# Patient Record
Sex: MALE | Race: WHITE
[De-identification: names, ages, dates, MRNs, and addresses within clinical notes are randomized per-mention and may not be internally consistent; named-entity substitution may affect disease eponyms.]

---

## 2017-10-11 ENCOUNTER — HOSPITAL ENCOUNTER (OUTPATIENT)
Dept: HOSPITAL 22 - RAD | Age: 77
End: 2017-10-11
Attending: OTOLARYNGOLOGY
Payer: MEDICARE

## 2017-10-11 DIAGNOSIS — R59.0: ICD-10-CM

## 2017-10-11 DIAGNOSIS — S03.00XA: Primary | ICD-10-CM

## 2017-10-11 LAB
BASOPHILS # BLD AUTO: 1.9 K/MM3 (ref 0.7–4.5)
BUN: 18 MG/DL (ref 7–18)
EOSINOPHIL NFR BLD AUTO: 29.9 % (ref 10–50)
GFR SERPLBLD BASED ON 1.73 SQ M-ARVRAT: 82 ML/MIN (ref 60–?)
HCT VFR BLD CALC: 16 G/DL (ref 14.1–18)

## 2017-10-11 NOTE — RADIOLOGY REPORT PS360
CT SOFT TISSUE NECK W/CONTRAST
 
COMPARISON: None
 
HISTORY: Suspected left parotid gland swelling, intermittent dislocation left
temporal mandibular joint
 
TECHNIQUE: Multiaxial scans were obtained from the level of the fore head to the
upper chest following injection of IV contrast. Sagittal and coronal reformats
were evaluated as well.
 
FINDINGS: There is mild to moderate diffuse swelling of the left parotid gland
when compared to the right gland. The left parotid gland maintains is normal
attenuation with homogeneous appearing parenchyma. There does appear to be a
very slight 2 to 3 mm lateral and slightly inferior subluxation of the left
mandibular condyle in relationship to the temporomandibular fossa. The right TMJ
appears normal. The mastoids appear clear bilaterally and both internal auditory
canals appear normal. There is prominent streak artifact crossing the oral
cavity from dental amalgam which partially degrades images of both carotid and
glands. The submandibular glands appear normal and symmetrical. The valleculae
and piriform sinuses appear normal. The false and true vocal cords appear
normal. There are 2 tiny hypodense and likely cystic lesions in the right lobe
of the thyroid with a single hypodense lesion of the left lobe which appears to
have internal echoes and could be an adenoma. Consider follow-up ultrasound the
thyroid for additional evaluation. There is no abnormal cervical
lymphadenopathy.
 
 IMPRESSION: Mild to moderate diffuse swelling of the left parotid gland but
basically maintaining its normal morphology and likely due to mild diffuse
inflammation. There appears be mild lateral subluxation of the left TMJ as
discussed above.

## 2019-11-27 ENCOUNTER — OFFICE VISIT (OUTPATIENT)
Dept: ORTHOPEDIC SURGERY | Facility: CLINIC | Age: 79
End: 2019-11-27

## 2019-11-27 VITALS — WEIGHT: 283 LBS | OXYGEN SATURATION: 98 % | HEART RATE: 94 BPM | BODY MASS INDEX: 38.33 KG/M2 | HEIGHT: 72 IN

## 2019-11-27 DIAGNOSIS — M17.12 PRIMARY OSTEOARTHRITIS OF LEFT KNEE: Primary | ICD-10-CM

## 2019-11-27 DIAGNOSIS — M25.552 PAIN OF LEFT HIP JOINT: ICD-10-CM

## 2019-11-27 PROCEDURE — 99203 OFFICE O/P NEW LOW 30 MIN: CPT | Performed by: ORTHOPAEDIC SURGERY

## 2019-11-27 RX ORDER — TEMAZEPAM 15 MG/1
CAPSULE ORAL
Refills: 1 | COMMUNITY
Start: 2019-10-25

## 2019-11-27 RX ORDER — FINASTERIDE 5 MG/1
5 TABLET, FILM COATED ORAL DAILY
Refills: 2 | COMMUNITY
Start: 2019-08-23

## 2019-11-27 RX ORDER — CITALOPRAM 20 MG/1
20 TABLET ORAL DAILY
Refills: 1 | COMMUNITY
Start: 2019-09-17

## 2019-11-27 RX ORDER — TAMSULOSIN HYDROCHLORIDE 0.4 MG/1
2 CAPSULE ORAL DAILY
Refills: 1 | COMMUNITY
Start: 2019-08-26

## 2019-11-27 RX ORDER — CETIRIZINE HYDROCHLORIDE 10 MG/1
10 TABLET ORAL DAILY
Refills: 1 | COMMUNITY
Start: 2019-10-23

## 2019-11-27 NOTE — PROGRESS NOTES
[facility] [place] [facility] Note    Subjective     Chief Complaint   Patient presents with   • Left Knee - Pain        HPI    Gio Gutiérrez is a 79 y.o. male.  He presents today for evaluation of left knee pain.  Knee has been bothering him for 3 months, following a particular injury.  The pain has been improving, is mild today, worse when he stands for a while, with no injury recently.      There is no problem list on file for this patient.    History reviewed. No pertinent past medical history.   Past Surgical History:   Procedure Laterality Date   • CHOLECYSTECTOMY     • HERNIA REPAIR     • KIDNEY STONE SURGERY        Family History   Problem Relation Age of Onset   • Stroke Mother    • Hypertension Mother    • Heart attack Mother    • Cancer Father    • Diabetes Father      Social History     Socioeconomic History   • Marital status:      Spouse name: Not on file   • Number of children: Not on file   • Years of education: Not on file   • Highest education level: Not on file   Tobacco Use   • Smoking status: Never Smoker   • Smokeless tobacco: Never Used   Substance and Sexual Activity   • Alcohol use: No     Frequency: Never   • Drug use: No   • Sexual activity: Defer      Current Outpatient Medications on File Prior to Visit   Medication Sig Dispense Refill   • cetirizine (zyrTEC) 10 MG tablet Take 10 mg by mouth Daily.  1   • citalopram (CeleXA) 20 MG tablet Take 20 mg by mouth Daily.  1   • finasteride (PROSCAR) 5 MG tablet Take 5 mg by mouth Daily.  2   • tamsulosin (FLOMAX) 0.4 MG capsule 24 hr capsule Take 2 capsules by mouth Daily.  1   • temazepam (RESTORIL) 15 MG capsule TAKE 2 CAPSULES BY MOUTH EVERY DAY AT BEDTIME  1     No current facility-administered medications on file prior to visit.       No Known Allergies     Review of Systems   Constitutional: Positive for fatigue.   HENT: Positive for hearing loss and sneezing.    Eyes: Negative.    Respiratory: Negative.    Cardiovascular:  "Negative.    Gastrointestinal: Negative.    Endocrine: Negative.    Genitourinary: Negative.    Musculoskeletal: Positive for arthralgias.   Skin: Negative.    Allergic/Immunologic: Negative.    Neurological: Negative.    Hematological: Negative.    Psychiatric/Behavioral: Positive for sleep disturbance.        Objective      Physical Exam  Pulse 94   Ht 182.9 cm (72\")   Wt 128 kg (283 lb)   SpO2 98%   BMI 38.38 kg/m²     Body mass index is 38.38 kg/m².    General:   Mental Status:  Alert   Appearance: Cooperative, in no acute distress   Build and Nutrition: Overweight male   Orientation: Alert and oriented to person, place and time   Posture: Normal   Gait: Normal    Integument:   Left knee: No skin lesions, no rash, no ecchymosis    Neurologic:   Sensation:    Left foot: Intact to light touch on the dorsal and plantar aspect   Motor:  Left lower extremity: 5/5 quadriceps, hamstrings, ankle dorsiflexors, and ankle plantar flexors  Vascular:   Left lower extremity: 2+ dorsalis pedis pulse, prompt capillary refill    Lower Extremities:   Left Knee:    Tenderness:  None    Effusion:  None    Swelling:  None    Crepitus:  None    Atrophy:  None    Range of motion:  Extension: 0°       Flexion: 120°  Instability:  No varus laxity, no valgus laxity, negative anterior drawer  Deformities:  None  Positive Stinchfield on the left hip, with restricted internal rotation of 10 degrees      Imaging/Studies      Imaging Results (Last 24 Hours)     Procedure Component Value Units Date/Time    XR Knee 4+ View Left [[id number]] Resulted:  11/27/19 1334     Updated:  11/27/19 1335    Narrative:       Left Knee Radiographs  Indication: left knee pain  Views: Standing AP's and skiers of both knees, with lateral and sunrise   views of the left knee    Comparison: no prior studies available    Findings:   Mild medial and lateral joint space narrowing, with patellofemoral   degeneration, no acute bony abnormalities, with good " alignment.          Left knee radiographs were also obtained, which showed mild degeneration of the hip.  Please see my report for details.    Assessment and Plan     Gio was seen today for pain.    Diagnoses and all orders for this visit:    Primary osteoarthritis of left knee  -     XR Knee 4+ View Left    Pain of left hip joint  -     XR Hip With or Without Pelvis 2 - 3 View Left; Future        1. Primary osteoarthritis of left knee    2. Pain of left hip joint        I reviewed my findings with the patient today.  He has mild left knee pain, and I thought that some of his pain was coming from the hip joint, and x-ray of the hip was obtained.  He has mild degenerative changes in the hip also.  There may be a combination of etiologies, but the pain overall is mild, and conservative treatment was recommended.  Injections may be considered in the future if appropriate if he has worsening symptoms.    He was also telling me about his left shoulder at the end of our visit.  He will make a separate appointment to have that evaluated.    Return if symptoms worsen or fail to improve.      Medical Decision Making  Data/Risk: radiology tests and independent visualization of imaging, lab tests, or EMG/NCV      Jean Cobb MD  11/27/19  2:17 PM

## 2019-12-23 ENCOUNTER — OFFICE VISIT (OUTPATIENT)
Dept: ORTHOPEDIC SURGERY | Facility: CLINIC | Age: 79
End: 2019-12-23

## 2019-12-23 VITALS — BODY MASS INDEX: 38.09 KG/M2 | OXYGEN SATURATION: 99 % | WEIGHT: 281.2 LBS | HEART RATE: 92 BPM | HEIGHT: 72 IN

## 2019-12-23 DIAGNOSIS — IMO0002 DISORDER OF ROTATOR CUFF SYNDROME OF LEFT SHOULDER AND ALLIED DISORDER: ICD-10-CM

## 2019-12-23 DIAGNOSIS — M25.512 CHRONIC LEFT SHOULDER PAIN: Primary | ICD-10-CM

## 2019-12-23 DIAGNOSIS — G89.29 CHRONIC LEFT SHOULDER PAIN: Primary | ICD-10-CM

## 2019-12-23 PROCEDURE — 99213 OFFICE O/P EST LOW 20 MIN: CPT | Performed by: PHYSICIAN ASSISTANT

## 2019-12-23 NOTE — PROGRESS NOTES
"    [facility] [place] [facility] [place]        Subjective     CC: Pain of the Left Shoulder (Left shoulder pain; Initial injury in May when carrying a heavy [id number] it when he fell 3 weeks ago)      LUCIO Gutiérrez is a 79 y.o. male.  Right-hand-dominant.  Patient presents for evaluation of his left shoulder.  In May 2019 he was carrying ferns when he noted pain to the left shoulder.  He was seen at [facility] and provided a corticosteroid injection.  He reports the injection relieved his pain.  Then approximately 3 weeks ago he fell causing exacerbation of his pain.  He was seen [facility] and provided a second corticosteroid injection to the left shoulder which has provided no relief in symptoms.  He notes pain throughout the shoulder.  Pain scale 5-6/10.  Severity the pain is moderate.  Quality the pain throbbing, aching, stabbing.  Patient has difficulty with any reaching, lifting, dressing to include overhead activities.  No reported numbness or tingling into the extremity.    Patient previously seen by Dr. Cobb for his left knee in November 2019    ROS:    Constiutional:Pt denies fever, chills, nausea, or vomiting.  MSK:as above        Objective      Past Medical History  History reviewed. No pertinent past medical history.      Physical Exam  Pulse 92   Ht 182.9 cm (72.01\")   Wt 128 kg (281 lb 3.2 oz)   SpO2 99%   BMI 38.13 kg/m²     Body mass index is 38.13 kg/m².    Patient is well nourished and well developed.        Ortho Exam  Musculoskeletal   Upper Extremity   Left Shoulder     Inspection and Palpation:     Tenderness -positive global tenderness throughout shoulder to include ACJ.    Crepitus - mild    Sensation is normal    Examination reveals no ecchymosis.      Strength and Tone:    Supraspinatus - 4-/5    External Rotators-5/5    Infraspinatus - 5/5    Subscapularis - 4+/5    Deltoid - 5/5     Range of Motion   Left Shoulder:    Internal Rotation: ROM -side " of body to back hip pocket    External Rotation: AROM - 65 degrees    Elevation through flexion: AROM - 150 degrees with increasing pain     Instability   Left shoulder    Sulcus sign negative    Apprehension test negative    Anabell relocation test negative    Jerk test negative     Impingement   Left shoulder    Springer-Alexi impingement test positive    Neer impingement test positive     Functional Testing   Left shoulder    AC crossover adduction test positive    Abdominal compression test mild discomfort    Lift-off sign mild discomfort    Speed's test negative      Imaging/Labs/EMG Reviewed:  Ordered left shoulder plain films.  Imaging read by Dr. Brewster.    Imaging Results (Last 24 Hours)     Procedure Component Value Units Date/Time    XR Shoulder 2+ View Left [[id number]] Resulted:  12/23/19 0957     Updated:  12/23/19 0957    Narrative:       Left Shoulder X-Ray  Indication: Pain  AP, scapular Y, and axillary lateral views    Findings:  No fracture  No bony lesion  Normal soft tissues  Normal joint spaces    No prior studies were available for comparison.              Assessment:  1. Chronic left shoulder pain    2. Disorder of rotator cuff syndrome of left shoulder and allied disorder        Plan:  1. Chronic shoulder pain due to rotator cuff syndrome/tear.  2. Patient has had 2 corticosteroid injections.  The first did provide significant relief the second no relief in symptoms.  3. Proceed with MRI for further evaluation of the left shoulder--rotator cuff tear.  4. Patient already has an appointment with Dr. Cobb on 1/6/2020.  Attempt to get MRI prior to this appointment.  If MRI is not completed then pushes appointment out until the MRI is completed.  5. Recommend over-the-counter pain medications as needed.  6. Follow-up as directed.  7. Questions and concerns answered.    Case discussed with Dr. Brewster who agrees with the above assessment and plan.      Yomaira Aburto PA-C  12/23/19  10:07 AM

## 2020-01-03 ENCOUNTER — APPOINTMENT (OUTPATIENT)
Dept: MRI IMAGING | Facility: HOSPITAL | Age: 80
End: 2020-01-03

## 2020-01-21 ENCOUNTER — HOSPITAL ENCOUNTER (OUTPATIENT)
Dept: MRI IMAGING | Facility: HOSPITAL | Age: 80
Discharge: HOME | End: 2020-01-21
Admitting: PHYSICIAN ASSISTANT

## 2020-01-21 DIAGNOSIS — IMO0002 DISORDER OF ROTATOR CUFF SYNDROME OF LEFT SHOULDER AND ALLIED DISORDER: ICD-10-CM

## 2020-01-21 DIAGNOSIS — M25.512 CHRONIC LEFT SHOULDER PAIN: ICD-10-CM

## 2020-01-21 DIAGNOSIS — G89.29 CHRONIC LEFT SHOULDER PAIN: ICD-10-CM

## 2020-01-21 PROCEDURE — 73221 MRI JOINT UPR EXTREM W/O DYE: CPT

## 2020-01-27 ENCOUNTER — OFFICE VISIT (OUTPATIENT)
Dept: ORTHOPEDIC SURGERY | Facility: CLINIC | Age: 80
End: 2020-01-27

## 2020-01-27 VITALS — HEART RATE: 93 BPM | HEIGHT: 72 IN | BODY MASS INDEX: 37.55 KG/M2 | WEIGHT: 277.2 LBS | OXYGEN SATURATION: 99 %

## 2020-01-27 DIAGNOSIS — IMO0002 DISORDER OF ROTATOR CUFF SYNDROME OF LEFT SHOULDER AND ALLIED DISORDER: Primary | ICD-10-CM

## 2020-01-27 PROCEDURE — 20610 DRAIN/INJ JOINT/BURSA W/O US: CPT | Performed by: ORTHOPAEDIC SURGERY

## 2020-01-27 PROCEDURE — 99213 OFFICE O/P EST LOW 20 MIN: CPT | Performed by: ORTHOPAEDIC SURGERY

## 2020-01-27 RX ORDER — OMEPRAZOLE 20 MG/1
CAPSULE, DELAYED RELEASE ORAL
COMMUNITY
Start: 2020-01-15

## 2020-01-27 RX ADMIN — ROPIVACAINE HYDROCHLORIDE 4 ML: 5 INJECTION, SOLUTION EPIDURAL; INFILTRATION; PERINEURAL at 12:23

## 2020-01-27 RX ADMIN — TRIAMCINOLONE ACETONIDE 40 MG: 40 INJECTION, SUSPENSION INTRA-ARTICULAR; INTRAMUSCULAR at 12:23

## 2020-01-27 NOTE — PROGRESS NOTES
Procedure   Large Joint Arthrocentesis: L subacromial bursa  Date/Time: 1/27/2020 12:23 PM  Consent given by: patient  Site marked: site marked  Timeout: Immediately prior to procedure a time out was called to verify the correct patient, procedure, equipment, support staff and site/side marked as required   Supporting Documentation  Indications: pain   Procedure Details  Location: shoulder - L subacromial bursa  Preparation: Patient was prepped and draped in the usual sterile fashion  Needle size: 22 G  Approach: anterolateral  Medications administered: 40 mg triamcinolone acetonide 40 MG/ML; 4 mL ropivacaine 0.5 %  Patient tolerance: patient tolerated the procedure well with no immediate complications

## 2020-01-27 NOTE — PROGRESS NOTES
[facility] [place] [facility] [place]    Subjective     Chief Complaint   Patient presents with   • Left Shoulder - Pain     MRI follow up        HPI    Gio Gutiérrez is a 79 y.o. male.  He follows up today for the MRI results of his left shoulder.  Pain is been ongoing for 8 months.  Pain is 5-6 out of 10, throbbing in quality, and associated with popping.  Hurts with overhead activities.  No previous physical therapy.      There is no problem list on file for this patient.    History reviewed. No pertinent past medical history.   Past Surgical History:   Procedure Laterality Date   • CHOLECYSTECTOMY     • HERNIA REPAIR     • KIDNEY STONE SURGERY        Family History   Problem Relation Age of Onset   • Stroke Mother    • Hypertension Mother    • Heart attack Mother    • Cancer Father    • Diabetes Father      Social History     Socioeconomic History   • Marital status:      Spouse name: Not on file   • Number of children: Not on file   • Years of education: Not on file   • Highest education level: Not on file   Tobacco Use   • Smoking status: Never Smoker   • Smokeless tobacco: Never Used   Substance and Sexual Activity   • Alcohol use: No     Frequency: Never   • Drug use: No   • Sexual activity: Defer      Current Outpatient Medications on File Prior to Visit   Medication Sig Dispense Refill   • cetirizine (zyrTEC) 10 MG tablet Take 10 mg by mouth Daily.  1   • citalopram (CeleXA) 20 MG tablet Take 20 mg by mouth Daily.  1   • finasteride (PROSCAR) 5 MG tablet Take 5 mg by mouth Daily.  2   • omeprazole (priLOSEC) 20 MG capsule      • tamsulosin (FLOMAX) 0.4 MG capsule 24 hr capsule Take 2 capsules by mouth Daily.  1   • temazepam (RESTORIL) 15 MG capsule TAKE 2 CAPSULES BY MOUTH EVERY DAY AT BEDTIME  1     No current facility-administered medications on file prior to visit.       No Known Allergies     Review of Systems   Constitutional: Negative.    HENT: Negative.    Eyes: Negative.    Respiratory:  "Negative.    Cardiovascular: Negative.    Gastrointestinal: Positive for constipation and diarrhea.   Endocrine: Negative.    Genitourinary: Negative.    Musculoskeletal: Positive for arthralgias.   Skin: Negative.    Allergic/Immunologic: Negative.    Neurological: Negative.    Hematological: Negative.    Psychiatric/Behavioral: Positive for sleep disturbance.        Objective      Physical Exam  Pulse 93   Ht 182.9 cm (72.01\")   Wt 126 kg (277 lb 3.2 oz)   SpO2 99%   BMI 37.59 kg/m²     Body mass index is 37.59 kg/m².    General:   Mental Status:  Alert   Appearance: Cooperative, in no acute distress   Build and Nutrition: Obese male   Orientation: Alert and oriented to person, place and time   Posture: Normal   Gait: Normal    Integument:   Left shoulder: No skin lesions, no rash, no ecchymosis    Upper Extremities:   Left Shoulder:    Tenderness:  None    Swelling:  None    Range of motion:  External rotation:  50°       Forward flexion:  150°       Abduction:   120°  Deformities:  None  Functional testing: Negative drop arm, negative lift-off, positive impingement      Imaging/Studies    EXAMINATION: MRI SHOULDER LEFT WO CONTRAST - 01/22/2020     INDICATION: ; M25.512-Pain in left shoulder; G89.29-Other chronic pain;  M75.102-Unspecified rotator cuff tear or rupture of left shoulder, not  specified as traumatic      TECHNIQUE: Multiplanar MRI of the shoulder without intravenous contrast     COMPARISON: Radiographs dated 12/23/2019     FINDINGS: Osseous structures demonstrate moderate DJD of the AC joint  without joint space widening or acute cortical irregularity of the  adjacent osseous structures including humeral head and osseous glenoid.     ROTATOR CUFF: Supraspinatus tendon fibers distally have hyperintense  signal consistent with partial thickness tearing articular sided  predominance without full-thickness or retraction. Cystic degeneration  noted adjacent to osteophytic spurring of the subacromial " joint space  and degenerative AC joint concerning for impingement with moderate  subacromial/subdeltoid fluid. Infraspinatus has minimal increased signal  with tendinopathy without tearing evident. Subscapularis has wavy  contour on axial sequence with increased signal concerning for partial  thickness tearing without retraction. Long head of the biceps is  well-seated within the bicipital groove. Increased signal within the  rotator interval with there is edema present. Teres minor intact.     Inferior capsular thickening is noted however perineural fat preserved  in the quadrilateral space and axillary pouch without neurovascular  impingement identified. No gross labral tear is identified however with  degeneration surrounding this region.     IMPRESSION:  Partial thickness tearing supraspinatus as well as abnormal  configuration of wavy contour partial thickness tearing to  full-thickness tearing subscapularis with abnormal signal within the  rotator interval and subacromial joint space root impingement is noted  from degenerative changes and osteophytosis of the acromioclavicular  joint and acromion lateral margin. Subacromial/subdeltoid fluid with  adjacent edema. Given rotator interval edema and fluid as well as  inferior capsular thickening adhesive capsulitis considered in the  appropriate clinical setting.     DICTATED:   01/22/2020  EDITED/ls :   01/22/2020      This report was finalized on 1/24/2020 9:07 AM by Dr. Dieudonne Melissa.    Assessment and Plan     Gio was seen today for pain.    Diagnoses and all orders for this visit:    Disorder of rotator cuff syndrome of left shoulder and allied disorder  -     Large Joint Arthrocentesis: L subacromial bursa  -     Ambulatory Referral to Physical Therapy Evaluate and treat  -     ropivacaine (NAROPIN) 0.5 % injection 4 mL  -     triamcinolone acetonide (KENALOG-40) injection 40 mg        1. Disorder of rotator cuff syndrome of left shoulder and allied disorder         I reviewed my findings with the patient today.  MRI showed partial tearing of the rotator cuff, and possible full-thickness tearing of the subscapularis.  I offered him a subacromial injection and physical therapy referral, and I will see him back in 2 months to ensure improvement.  Surgical intervention may be considered in the future if appropriate, and I would refer him to a shoulder specialist if that is the case.    Of note, he had 10% relief just a few minutes following the injection today.    Return in about 2 months (around 3/27/2020).        Medical Decision Making  Management Options : prescription/IM medicine and physical/occupational therapy  Data/Risk: independent visualization of imaging, lab tests, or EMG/NCV      Jean Cobb MD  02/04/20  4:08 PM

## 2020-01-28 RX ORDER — TRIAMCINOLONE ACETONIDE 40 MG/ML
40 INJECTION, SUSPENSION INTRA-ARTICULAR; INTRAMUSCULAR
Status: COMPLETED | OUTPATIENT
Start: 2020-01-27 | End: 2020-01-27

## 2020-01-28 RX ORDER — ROPIVACAINE HYDROCHLORIDE 5 MG/ML
4 INJECTION, SOLUTION EPIDURAL; INFILTRATION; PERINEURAL
Status: COMPLETED | OUTPATIENT
Start: 2020-01-27 | End: 2020-01-27

## 2020-04-06 ENCOUNTER — OFFICE VISIT (OUTPATIENT)
Dept: ORTHOPEDIC SURGERY | Facility: CLINIC | Age: 80
End: 2020-04-06

## 2020-04-06 DIAGNOSIS — IMO0002 DISORDER OF ROTATOR CUFF SYNDROME OF LEFT SHOULDER AND ALLIED DISORDER: Primary | ICD-10-CM

## 2020-04-06 PROCEDURE — 99441 PR PHYS/QHP TELEPHONE EVALUATION 5-10 MIN: CPT | Performed by: ORTHOPAEDIC SURGERY

## 2020-04-06 NOTE — PROGRESS NOTES
[facility] [place] [facility] [place]    Subjective     Chief Complaint   Patient presents with   • Follow-up     10 weeks follow up; Disorder of rotator cuff syndrome of left shoulder and allied disorder         HPI    You have chosen to receive care through a telephone visit today. Do you consent to use a telephone visit for your medical care today? Yes    It has been 10  week(s) since Mr. Gutiérrez's last visit. He returns to clinic today for follow-up of left shoulder pain. He rates his pain a 7/10 on the pain scale. Previous/current treatments: home exercise program. Current symptoms: pain and popping. The pain is worse with sitting, sleeping and working; heat and pain medication and/or NSAID improve the pain. Overall, he is doing the same.  He did have good brief relief with the injection on his last visit, and went to physical therapy, which also helped, but over the past few weeks the pain has returned.  He states that his motion is about the same as it was when I saw him the last time.    I have reviewed the following portions of the patient's history:History of Present Illness     There is no problem list on file for this patient.    History reviewed. No pertinent past medical history.   Past Surgical History:   Procedure Laterality Date   • CHOLECYSTECTOMY     • HERNIA REPAIR     • KIDNEY STONE SURGERY        Family History   Problem Relation Age of Onset   • Stroke Mother    • Hypertension Mother    • Heart attack Mother    • Cancer Father    • Diabetes Father      Social History     Socioeconomic History   • Marital status:      Spouse name: Not on file   • Number of children: Not on file   • Years of education: Not on file   • Highest education level: Not on file   Tobacco Use   • Smoking status: Never Smoker   • Smokeless tobacco: Never Used   Substance and Sexual Activity   • Alcohol use: No     Frequency: Never   • Drug use: No   • Sexual activity: Defer      Current Outpatient Medications on  File Prior to Visit   Medication Sig Dispense Refill   • cetirizine (zyrTEC) 10 MG tablet Take 10 mg by mouth Daily.  1   • citalopram (CeleXA) 20 MG tablet Take 20 mg by mouth Daily.  1   • finasteride (PROSCAR) 5 MG tablet Take 5 mg by mouth Daily.  2   • omeprazole (priLOSEC) 20 MG capsule      • tamsulosin (FLOMAX) 0.4 MG capsule 24 hr capsule Take 2 capsules by mouth Daily.  1   • temazepam (RESTORIL) 15 MG capsule TAKE 2 CAPSULES BY MOUTH EVERY DAY AT BEDTIME  1     No current facility-administered medications on file prior to visit.       No Known Allergies     Review of Systems   Constitutional: Negative.    HENT: Negative.    Eyes: Negative.    Respiratory: Negative.    Cardiovascular: Negative.    Gastrointestinal: Negative.    Endocrine: Negative.    Genitourinary: Negative.    Musculoskeletal: Positive for arthralgias.   Skin: Negative.    Allergic/Immunologic: Negative.    Neurological: Negative.    Hematological: Negative.    Psychiatric/Behavioral: Negative.         Objective      Physical Exam  There were no vitals taken for this visit.    There is no height or weight on file to calculate BMI.    General:   Mental Status:  Alert   Appearance: Cooperative, in no acute distress   Orientation: Alert and oriented to person, place and time    No elements of the physical examination are possible given the telephone nature of this follow-up visit.        Assessment and Plan     Gio was seen today for follow-up.    Diagnoses and all orders for this visit:    Disorder of rotator cuff syndrome of left shoulder and allied disorder        1. Disorder of rotator cuff syndrome of left shoulder and allied disorder        I reviewed my findings with the patient today.  His shoulder did improve with physical therapy and the injection, but the pain is starting to come back.  Once the clinical situation dictates, he would like to come back in for an injection.  Long-term he may be a surgical candidate if he has  continued pain in the future.  He will follow-up with me for any worsening or problems.  He is agreeable to this follow-up plan, and his questions were answered.    Return if symptoms worsen or fail to improve.      This visit has been rescheduled as a phone visit to comply with patient safety concerns in accordance with CDC recommendations. Total time of discussion was 5 minutes.    Jean Cobb MD  04/06/20  15:48    Dragon disclaimer:  Much of this encounter note is an electronic transcription/translation of spoken language to printed text. The electronic translation of spoken language may permit erroneous, or at times, nonsensical words or phrases to be inadvertently transcribed; Although I have reviewed the note for such errors, some may still exist.

## 2020-04-23 ENCOUNTER — TELEPHONE (OUTPATIENT)
Dept: ORTHOPEDIC SURGERY | Facility: CLINIC | Age: 80
End: 2020-04-23

## 2020-04-23 DIAGNOSIS — IMO0002 DISORDER OF ROTATOR CUFF SYNDROME OF LEFT SHOULDER AND ALLIED DISORDER: Primary | ICD-10-CM

## 2020-04-23 NOTE — TELEPHONE ENCOUNTER
[facility] CALLED NEEDING A NEW PT ORDER FOR PATIENT NOW THAT THEY ARE ABLE TO START SEEING PATIENTS AGAIN.  HER FAX NUMBER IS [phone].  PLEASE ROUTE BACK TO CLINIC AFTER ORDER HAS BEEN PUT IN.  THANK YOU!!

## 2020-05-04 ENCOUNTER — OFFICE VISIT (OUTPATIENT)
Dept: ORTHOPEDIC SURGERY | Facility: CLINIC | Age: 80
End: 2020-05-04

## 2020-05-04 VITALS — OXYGEN SATURATION: 98 % | HEART RATE: 98 BPM | BODY MASS INDEX: 37.52 KG/M2 | HEIGHT: 72 IN | WEIGHT: 277 LBS

## 2020-05-04 DIAGNOSIS — IMO0002 DISORDER OF ROTATOR CUFF SYNDROME OF LEFT SHOULDER AND ALLIED DISORDER: Primary | ICD-10-CM

## 2020-05-04 PROCEDURE — 20610 DRAIN/INJ JOINT/BURSA W/O US: CPT | Performed by: ORTHOPAEDIC SURGERY

## 2020-05-04 RX ORDER — TRIAMCINOLONE ACETONIDE 40 MG/ML
40 INJECTION, SUSPENSION INTRA-ARTICULAR; INTRAMUSCULAR
Status: COMPLETED | OUTPATIENT
Start: 2020-05-04 | End: 2020-05-04

## 2020-05-04 RX ADMIN — TRIAMCINOLONE ACETONIDE 40 MG: 40 INJECTION, SUSPENSION INTRA-ARTICULAR; INTRAMUSCULAR at 15:47

## 2020-05-04 NOTE — PROGRESS NOTES
Procedure   Large Joint Arthrocentesis: L subacromial bursa  Date/Time: 5/4/2020 3:47 PM  Consent given by: patient  Site marked: site marked  Timeout: Immediately prior to procedure a time out was called to verify the correct patient, procedure, equipment, support staff and site/side marked as required   Supporting Documentation  Indications: pain   Procedure Details  Location: shoulder - L subacromial bursa  Preparation: Patient was prepped and draped in the usual sterile fashion  Needle size: 22 G  Approach: posterior  Medications administered: 40 mg triamcinolone acetonide 40 MG/ML; 4 mL lidocaine (cardiac)  Patient tolerance: patient tolerated the procedure well with no immediate complications

## 2021-10-03 NOTE — PROGRESS NOTES
[facility] [place] Orthopaedic Surgery Clinic Note    Subjective     Chief Complaint   Patient presents with   • Follow-up     1 month follow up; Disorder of rotator cuff syndrome of left shoulder and allied disorder-last subacromial injection given 1/27/20        HPI    It has been 1  month(s) since Mr. Gutiérrez's last visit. He returns to clinic today for follow-up of left shoulder pain. He rates his pain a 8/10 on the pain scale. Previous/current treatments: NSAIDS, physical therapy and steroid injection (last injection 01/27/20). Current symptoms: pain and popping. The pain is worse with sitting and sleeping; pain medication and/or NSAID improve the pain. Overall, he is doing the same.  He would like to have an injection today.  He tried physical therapy, with minimal relief.    I have reviewed the following portions of the patient's history:History of Present Illness     There is no problem list on file for this patient.    History reviewed. No pertinent past medical history.   Past Surgical History:   Procedure Laterality Date   • CHOLECYSTECTOMY     • HERNIA REPAIR     • KIDNEY STONE SURGERY        Family History   Problem Relation Age of Onset   • Stroke Mother    • Hypertension Mother    • Heart attack Mother    • Cancer Father    • Diabetes Father      Social History     Socioeconomic History   • Marital status:      Spouse name: Not on file   • Number of children: Not on file   • Years of education: Not on file   • Highest education level: Not on file   Tobacco Use   • Smoking status: Never Smoker   • Smokeless tobacco: Never Used   Substance and Sexual Activity   • Alcohol use: No     Frequency: Never   • Drug use: No   • Sexual activity: Defer      Current Outpatient Medications on File Prior to Visit   Medication Sig Dispense Refill   • cetirizine (zyrTEC) 10 MG tablet Take 10 mg by mouth Daily.  1   • citalopram (CeleXA) 20 MG tablet Take 20 mg by mouth Daily.  1   • finasteride (PROSCAR) 5 MG tablet Take 5  "mg by mouth Daily.  2   • omeprazole (priLOSEC) 20 MG capsule      • tamsulosin (FLOMAX) 0.4 MG capsule 24 hr capsule Take 2 capsules by mouth Daily.  1   • temazepam (RESTORIL) 15 MG capsule TAKE 2 CAPSULES BY MOUTH EVERY DAY AT BEDTIME  1     No current facility-administered medications on file prior to visit.       No Known Allergies     Review of Systems   Constitutional: Negative.    HENT: Negative.    Eyes: Negative.    Respiratory: Negative.    Cardiovascular: Negative.    Gastrointestinal: Negative.    Endocrine: Negative.    Genitourinary: Negative.    Musculoskeletal: Positive for arthralgias.   Skin: Negative.    Allergic/Immunologic: Negative.    Neurological: Negative.    Hematological: Negative.    Psychiatric/Behavioral: Negative.         Objective      Physical Exam  Pulse 98   Ht 182.9 cm (72.01\")   Wt 126 kg (277 lb)   SpO2 98%   BMI 37.56 kg/m²     Body mass index is 37.56 kg/m².    General:   Mental Status:  Alert   Appearance: Cooperative, in no acute distress   Build and Nutrition: Overweight male   Orientation: Alert and oriented to person, place and time   Posture: Normal   Gait: Normal    Integument:              Left shoulder: No skin lesions, no rash, no ecchymosis     Upper Extremities:              Left Shoulder:                          Tenderness:    None                          Swelling:          None                          Range of motion:        External rotation:         40°                                                              Forward flexion:          100°                                                              Abduction:                   100°  Deformities:     None      Assessment and Plan     Gio was seen today for follow-up.    Diagnoses and all orders for this visit:    Disorder of rotator cuff syndrome of left shoulder and allied disorder  -     Large Joint Arthrocentesis: L subacromial bursa        1. Disorder of rotator cuff syndrome of left shoulder " and allied disorder        I reviewed my findings with the patient today.  He continues to have shoulder pain, and the last injection helped.  He would like a repeat injection today, and this was provided.  He is not keen on surgical intervention.  However, I did discuss that if he has continued pain he may require surgical intervention.  I will see him back in 2 months, but sooner for any problems.  If he does need surgery, we may refer him to a shoulder specialist.    Of note, he had 10% relief just few minutes following the injection today.    Return in about 2 months (around 7/4/2020).    Medical Decision Making  Management Options : prescription/IM medicine      Jean Cobb MD  05/04/20  15:57    Dragon disclaimer:  Much of this encounter note is an electronic transcription/translation of spoken language to printed text. The electronic translation of spoken language may permit erroneous, or at times, nonsensical words or phrases to be inadvertently transcribed; Although I have reviewed the note for such errors, some may still exist.   HPI:  40 year old male with pmhx of drug use and anxiety presents with rash to right lower extremity x 2 days. mild swelling and pain. no trauma, fever, chills, abd pain, nausea, vomiting, diarrhea, chest pain or sob  On admission: T(F): 97, HR: 49, BP: 95/50, RR: 16, SpO2: 98%. WBC nl    (03 Oct 2021 20:15)    REVIEW OF SYSTEMS: see cc/HPI  CONSTITUTIONAL: No weakness, fevers or chills  EYES/ENT: No visual changes;  No vertigo or throat pain   NECK: No pain or stiffness  RESPIRATORY: No cough, wheezing, hemoptysis; No shortness of breath  CARDIOVASCULAR: No chest pain or palpitations  GASTROINTESTINAL: No abdominal or epigastric pain. No nausea, vomiting, or hematemesis; No diarrhea or constipation. No melena or hematochezia.  GENITOURINARY: No dysuria, frequency or hematuria  NEUROLOGICAL: No numbness or weakness  SKIN: No itching, rashes    Physical Exam:  General: WN/WD NAD  Neurology: A&Ox3, nonfocal, follows commands  Eyes: PERRLA/ EOMI  ENT/Neck: Neck supple, trachea midline, No JVD  Respiratory: CTA B/L, No wheezing, rales, rhonchi  CV: Normal rate regular rhythm, S1S2, no murmurs, rubs or gallops  Abdominal: Soft, NT, ND +BS,   Extremities: No edema, + peripheral pulses  Skin: No Rashes, Hematoma, Ecchymosis  Incisions:   Tubes: HPI:  40 year old male with pmhx of drug use and anxiety presents with rash to right lower extremity x 2 days. mild swelling and pain. no trauma, fever, chills, abd pain, nausea, vomiting, diarrhea, chest pain or sob  On admission: T(F): 97, HR: 49, BP: 95/50, RR: 16, SpO2: 98%. WBC nl    (03 Oct 2021 20:15)    REVIEW OF SYSTEMS: see cc/HPI  CONSTITUTIONAL: No weakness, fevers or chills  EYES/ENT: No visual changes;  No vertigo or throat pain   NECK: No pain or stiffness  RESPIRATORY: No cough, wheezing, hemoptysis; No shortness of breath  CARDIOVASCULAR: No chest pain or palpitations  GASTROINTESTINAL: No abdominal or epigastric pain. No nausea, vomiting, or hematemesis; No diarrhea or constipation. No melena or hematochezia.  GENITOURINARY: No dysuria, frequency or hematuria  NEUROLOGICAL: No numbness or weakness  SKIN: No itching, (+) rashes - distal R LE w/ circumferential pattern w/ spared section and blistering on the medial aspect of the calf    Physical Exam:  General: WN/WD NAD  Neurology: A&Ox3, nonfocal, follows commands  Eyes: PERRLA/ EOMI  ENT/Neck: Neck supple, trachea midline, No JVD  Respiratory: CTA B/L, No wheezing, rales, rhonchi  CV: Normal rate regular rhythm, S1S2, no murmurs, rubs or gallops  Abdominal: Soft, NT, ND +BS,   Extremities: No edema, + peripheral pulses, (+) rashes - distal R LE w/ circumferential pattern w/ spared section and blistering on the medial aspect of the calf  Skin: Hematoma, Ecchymosis, see above   Incisions:   Tubes:    A/p   Rash - suspect contact derm vs. cellulitis ( less likely) vs. allergic reaction   -check ESR/CRP  -check blood Cx  -trial of topical steroids  -Derm or Burn for possible biopsy of blistering lesion  -would hold off on Abx for now      Polysubstance  abuse w/ recent heroin use 2-3 days ago   -CATCH team    Anxiety   -c/w Prozac     H/o Seizure disorder   -c/w AED     Tobacco abuse - requesting a patch - 21 mg    DVT prophylaxis

## 2022-06-15 ENCOUNTER — OFFICE VISIT (OUTPATIENT)
Dept: ORTHOPEDIC SURGERY | Facility: CLINIC | Age: 82
End: 2022-06-15

## 2022-06-15 VITALS
DIASTOLIC BLOOD PRESSURE: 75 MMHG | SYSTOLIC BLOOD PRESSURE: 124 MMHG | BODY MASS INDEX: 37.25 KG/M2 | WEIGHT: 275 LBS | HEIGHT: 72 IN

## 2022-06-15 DIAGNOSIS — M25.561 RIGHT KNEE PAIN, UNSPECIFIED CHRONICITY: Primary | ICD-10-CM

## 2022-06-15 DIAGNOSIS — E66.09 CLASS 2 OBESITY DUE TO EXCESS CALORIES WITHOUT SERIOUS COMORBIDITY WITH BODY MASS INDEX (BMI) OF 37.0 TO 37.9 IN ADULT: ICD-10-CM

## 2022-06-15 DIAGNOSIS — M17.11 PRIMARY OSTEOARTHRITIS OF RIGHT KNEE: ICD-10-CM

## 2022-06-15 PROCEDURE — 20610 DRAIN/INJ JOINT/BURSA W/O US: CPT | Performed by: PHYSICIAN ASSISTANT

## 2022-06-15 PROCEDURE — 99214 OFFICE O/P EST MOD 30 MIN: CPT | Performed by: PHYSICIAN ASSISTANT

## 2022-06-15 RX ORDER — IBUPROFEN 200 MG
200 TABLET ORAL EVERY 6 HOURS PRN
COMMUNITY

## 2022-06-15 RX ADMIN — LIDOCAINE HYDROCHLORIDE 4 ML: 10 INJECTION, SOLUTION EPIDURAL; INFILTRATION; INTRACAUDAL; PERINEURAL at 11:57

## 2022-06-15 RX ADMIN — TRIAMCINOLONE ACETONIDE 40 MG: 40 INJECTION, SUSPENSION INTRA-ARTICULAR; INTRAMUSCULAR at 11:57

## 2022-06-15 NOTE — PROGRESS NOTES
Procedure   - Large Joint Arthrocentesis: R knee on 6/15/2022 11:57 AM  Indications: pain  Details: 22 G needle, anterolateral approach  Medications: 4 mL lidocaine PF 1% 1 %; 40 mg triamcinolone acetonide 40 MG/ML  Outcome: tolerated well, no immediate complications  Procedure, treatment alternatives, risks and benefits explained, specific risks discussed. Consent was given by the patient. Immediately prior to procedure a time out was called to verify the correct patient, procedure, equipment, support staff and site/side marked as required. Patient was prepped and draped in the usual sterile fashion.

## 2022-06-15 NOTE — PROGRESS NOTES
[facility] – [place] Orthopaedic Surgery Clinic Note        Subjective     Pain of the Right Knee      HPI    Gio Gutiérrez is a 82 y.o. male.  This is a very pleasant patient presenting today to discuss his right knee pain.  He reports he had a twisting injury about 10 days ago.  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  He complains of medial based pain.  No mechanical symptoms.  Aching burning throbbing and stabbing.  Functional pain as well as occasional night pain.  Treated with ibuprofen and rest.    History reviewed. No pertinent past medical history.   Past Surgical History:   Procedure Laterality Date   • CHOLECYSTECTOMY     • HERNIA REPAIR     • KIDNEY STONE SURGERY        Family History   Problem Relation Age of Onset   • Stroke Mother    • Hypertension Mother    • Heart attack Mother    • Cancer Father    • Diabetes Father      Social History     Socioeconomic History   • Marital status:    Tobacco Use   • Smoking status: Never Smoker   • Smokeless tobacco: Never Used   Substance and Sexual Activity   • Alcohol use: No   • Drug use: No   • Sexual activity: Defer      Current Outpatient Medications on File Prior to Visit   Medication Sig Dispense Refill   • cetirizine (zyrTEC) 10 MG tablet Take 10 mg by mouth Daily.  1   • citalopram (CeleXA) 20 MG tablet Take 20 mg by mouth Daily.  1   • finasteride (PROSCAR) 5 MG tablet Take 5 mg by mouth Daily.  2   • ibuprofen (ADVIL,MOTRIN) 200 MG tablet Take 200 mg by mouth Every 6 (Six) Hours As Needed for Mild Pain .     • omeprazole (priLOSEC) 20 MG capsule      • tamsulosin (FLOMAX) 0.4 MG capsule 24 hr capsule Take 2 capsules by mouth Daily.  1   • temazepam (RESTORIL) 15 MG capsule TAKE 2 CAPSULES BY MOUTH EVERY DAY AT BEDTIME  1     No current facility-administered medications on file prior to visit.      No Known Allergies       Review of Systems   Constitutional: Negative.    HENT: Negative.    Eyes: Negative.    Respiratory:  "Negative.    Cardiovascular: Negative.    Gastrointestinal: Negative.    Endocrine: Negative.    Genitourinary: Negative.    Musculoskeletal: Positive for arthralgias.   Skin: Negative.    Allergic/Immunologic: Negative.    Neurological: Negative.    Hematological: Negative.    Psychiatric/Behavioral: Negative.         I reviewed the patient's chief complaint, history of present illness, review of systems, past medical history, surgical history, family history, social history, medications and allergy list.        Objective      Physical Exam  /75   Ht 182.9 cm (72.01\")   Wt 125 kg (275 lb)   BMI 37.29 kg/m²     Body mass index is 37.29 kg/m².    General  Mental Status - alert  General Appearance - cooperative, well groomed, not in acute distress  Orientation - Oriented X3  Build & Nutrition - well developed and well nourished  Posture - normal posture  Gait -mildly antalgic       Ortho Exam  Right knee exam: Tender over the medial joint line.  Range of motion 0-1 20 ligament stable valgus varus stress neurovascular tact distally.      Assessment    Assessment:  1. Right knee pain, unspecified chronicity    2. Primary osteoarthritis of right knee    3. Class 2 obesity due to excess calories without serious comorbidity with body mass index (BMI) of 37.0 to 37.9 in adult          Plan:  1. Recommend over-the-counter medication as needed for discomfort  2. Mild right knee arthritis with pain.  I reviewed today's x-rays clinical findings past and current treatment the patient.  X-rays today show mild medial joint space narrowing and patellofemoral degeneration.  I suspect his pain and focal imitations are secondary to this arthritis and overload.  We discussed treatment including continued observation, intra-articular cortisone injection as diagnostic and therapeutic, further imaging.  Plan today is cortisone injection into the right knee.  He will return if pain does not improve.  3. Patient has a BMI of 37.29 " the patient has been instructed on weight loss avenues including diet, portion control, calorie restriction, low/no impact exercise, referral to weight loss management and/or bariatric surgery.  It was explained that weight loss can improve joint pain alone by decreasing the joint reaction forces.  For every pound of weight change, the knee and hip joints see a 4 to 5 fold change in pressure.    I discussed with the patient the potential benefits of performing a therapeutic injection of the right knee as well as potential risks including but not limited to infection, swelling, pain, bleeding, bruising, nerve/vessel damage, skin color changes, transient elevation in blood glucose levels, and fat atrophy. After informed consent and verifying correct patient, procedure site, and type of procedure, the area was prepped with Hibiclens, ethyl chloride was used to numb the skin. Via the inferior lateral approach, 4cc of 1% lidocaine and  40mg/ml of Kenalog were injected into the right knee. The patient tolerated the procedure well. There were no complications.     4.         Haley Ribeiro PA-C  06/21/22  13:58 EDT

## 2022-06-21 RX ORDER — LIDOCAINE HYDROCHLORIDE 10 MG/ML
4 INJECTION, SOLUTION EPIDURAL; INFILTRATION; INTRACAUDAL; PERINEURAL
Status: COMPLETED | OUTPATIENT
Start: 2022-06-15 | End: 2022-06-15

## 2022-06-21 RX ORDER — TRIAMCINOLONE ACETONIDE 40 MG/ML
40 INJECTION, SUSPENSION INTRA-ARTICULAR; INTRAMUSCULAR
Status: COMPLETED | OUTPATIENT
Start: 2022-06-15 | End: 2022-06-15

## 2023-01-29 ENCOUNTER — TRANSCRIBE ORDERS (OUTPATIENT)
Dept: CARDIOLOGY | Facility: CLINIC | Age: 83
End: 2023-01-29
Payer: MEDICARE

## 2023-01-29 DIAGNOSIS — I50.22 CHRONIC SYSTOLIC CONGESTIVE HEART FAILURE: Primary | ICD-10-CM

## 2023-04-05 ENCOUNTER — OFFICE VISIT (OUTPATIENT)
Dept: CARDIOLOGY | Facility: CLINIC | Age: 83
End: 2023-04-05
Payer: MEDICARE

## 2023-04-05 VITALS
WEIGHT: 285 LBS | OXYGEN SATURATION: 96 % | DIASTOLIC BLOOD PRESSURE: 60 MMHG | SYSTOLIC BLOOD PRESSURE: 112 MMHG | HEIGHT: 72 IN | HEART RATE: 74 BPM | BODY MASS INDEX: 38.6 KG/M2

## 2023-04-05 DIAGNOSIS — I50.22 CHRONIC SYSTOLIC (CONGESTIVE) HEART FAILURE: ICD-10-CM

## 2023-04-05 DIAGNOSIS — R42 DIZZINESS AND GIDDINESS: ICD-10-CM

## 2023-04-05 DIAGNOSIS — I25.10 CORONARY ARTERY DISEASE INVOLVING NATIVE HEART WITHOUT ANGINA PECTORIS, UNSPECIFIED VESSEL OR LESION TYPE: ICD-10-CM

## 2023-04-05 PROBLEM — T21.06XA: Status: ACTIVE | Noted: 2017-09-28

## 2023-04-05 PROBLEM — U07.1 COVID: Status: RESOLVED | Noted: 2022-09-01 | Resolved: 2023-04-05

## 2023-04-05 PROCEDURE — 3074F SYST BP LT 130 MM HG: CPT | Performed by: NURSE PRACTITIONER

## 2023-04-05 PROCEDURE — 99214 OFFICE O/P EST MOD 30 MIN: CPT | Performed by: NURSE PRACTITIONER

## 2023-04-05 PROCEDURE — 3078F DIAST BP <80 MM HG: CPT | Performed by: NURSE PRACTITIONER

## 2023-04-05 RX ORDER — METOPROLOL SUCCINATE 25 MG/1
0.5 TABLET, EXTENDED RELEASE ORAL DAILY
COMMUNITY
Start: 2023-04-05

## 2023-04-05 RX ORDER — ATORVASTATIN CALCIUM 40 MG/1
1 TABLET, FILM COATED ORAL DAILY
COMMUNITY
Start: 2023-02-21

## 2023-04-05 RX ORDER — SACUBITRIL AND VALSARTAN 49; 51 MG/1; MG/1
TABLET, FILM COATED ORAL
COMMUNITY
Start: 2023-04-04

## 2023-04-05 NOTE — PROGRESS NOTES
[facility] Provider Note     Date:   2023   Name: Gio Gutiérrez  :   1940  PCP: Javi Carlos MD    Chief Complaint   Patient presents with   • Chest Pain     F/u echo results       Subjective     History of Present Illness  iGo Gutiérrez is a 82 y.o. male who presents today for echo results for known CHF.  On one of his previous notes his EF was 35% at 1 point.  He is doing really well on increased dose of Entresto.  His only issue is that he feels some dizziness when turning around.  He said this is especially apparent when he is on his boat and fell twice on his boat during his most recent camping trip.  We discussed staying hydrated, rising slowly, turning slowly, and to be even more cautious when in the water.  Him and his wife agree that he is an avid swimmer.  Blood pressure in clinic today is 112/60.  I feel he may be becoming hypotensive.  I have asked him to take half of his metoprolol 25 mg and see if that makes him feel any better.  He has a history of only mild coronary artery disease so we may end up stopping metoprolol altogether and just continuing Entresto. Euvolemic.    No chest pain, shortness of air, edema, palpitations, or syncope.    2023 echo reviewed.  EF 56 to 60%.  Diastolic function is consistent with grade 1 impaired relaxation.    2021 left heart cath with only mild coronary arthrosclerosis.    2023 carotid ultrasound bilateral less than 50%    I would like to see him back in 2 months to see how his dizziness and blood pressure are doing with the decrease in metoprolol.     Reports Denies   Chest Pain [] []   Shortness of Air [] [x]   Palpitations [] [x]   Edema [] [x]   Dizziness [x] []   Syncope [] [x]     No Known Allergies    Current Outpatient Medications:   •  atorvastatin (LIPITOR) 40 MG tablet, Take 1 tablet by mouth Daily., Disp: , Rfl:   •  cetirizine (zyrTEC) 10 MG tablet, Take 1 tablet by mouth Daily., Disp: ,  "Rfl: 1  •  citalopram (CeleXA) 20 MG tablet, Take 1 tablet by mouth Daily., Disp: , Rfl: 1  •  Entresto 49-51 MG tablet, , Disp: , Rfl:   •  finasteride (PROSCAR) 5 MG tablet, Take 1 tablet by mouth Daily., Disp: , Rfl: 2  •  ibuprofen (ADVIL,MOTRIN) 200 MG tablet, Take 1 tablet by mouth Every 6 (Six) Hours As Needed for Mild Pain., Disp: , Rfl:   •  metoprolol succinate XL (TOPROL-XL) 25 MG 24 hr tablet, Take 0.5 tablets by mouth Daily., Disp: , Rfl:   •  omeprazole (priLOSEC) 20 MG capsule, , Disp: , Rfl:   •  tamsulosin (FLOMAX) 0.4 MG capsule 24 hr capsule, Take 2 capsules by mouth Daily., Disp: , Rfl: 1  •  temazepam (RESTORIL) 15 MG capsule, TAKE 2 CAPSULES BY MOUTH EVERY DAY AT BEDTIME, Disp: , Rfl: 1    Past Medical History:   Diagnosis Date   • CAD (coronary artery disease)    • Cardiomegaly    • Chronic systolic (congestive) heart failure    • COVID 09/2022   • Diverticulosis    • Dizziness and giddiness    • Essential (primary) hypertension    • Hypercholesterolemia    • Occlusion and stenosis of bilateral carotid arteries    • Prostate disorder       Past Surgical History:   Procedure Laterality Date   • CHOLECYSTECTOMY     • HAND SURGERY     • HERNIA REPAIR     • KIDNEY STONE SURGERY       Family History   Problem Relation Age of Onset   • Stroke Mother    • Hypertension Mother    • Heart attack Mother    • Cancer Father         STOMACH   • Diabetes Father    • No Known Problems Sister      Social History     Socioeconomic History   • Marital status:    Tobacco Use   • Smoking status: Never   • Smokeless tobacco: Never   Substance and Sexual Activity   • Alcohol use: No   • Drug use: No   • Sexual activity: Defer       Objective     Vital Signs:  /60   Pulse 74   Ht 182.9 cm (72\")   Wt 129 kg (285 lb)   SpO2 96%   BMI 38.65 kg/m²   Estimated body mass index is 38.65 kg/m² as calculated from the following:    Height as of this encounter: 182.9 cm (72\").    Weight as of this encounter: " 129 kg (285 lb).             Physical Exam  Vitals reviewed.   Constitutional:       Appearance: Normal appearance. He is obese.   Eyes:      Pupils: Pupils are equal, round, and reactive to light.   Neck:      Vascular: No carotid bruit.   Cardiovascular:      Rate and Rhythm: Normal rate and regular rhythm.      Pulses: Normal pulses.      Heart sounds: Normal heart sounds.   Pulmonary:      Effort: Pulmonary effort is normal.      Breath sounds: Normal breath sounds.   Musculoskeletal:         General: Normal range of motion.      Right lower leg: No edema.      Left lower leg: No edema.   Skin:     General: Skin is warm and dry.   Neurological:      Mental Status: He is alert and oriented to person, place, and time.      Gait: Gait is intact.   Psychiatric:         Attention and Perception: Attention normal.         Mood and Affect: Mood normal.                     Assessment and Plan     Diagnoses and all orders for this visit:    1. Chronic systolic (congestive) heart failure  Assessment & Plan:  On one of his previous notes his EF was 35% at 1 point.  He is doing really well on increased dose of Entresto.  His only issue is that he feels some dizziness when turning around.  He said this is especially apparent when he is on his boat and fell twice on his boat during his most recent camping trip.  We discussed staying hydrated, rising slowly, turning slowly, and to be even more cautious when in the water.  Him and his wife agree that he is an avid swimmer.  Blood pressure in clinic today is 112/60.  I feel he may be becoming hypotensive.  I have asked him to take half of his metoprolol 25 mg and see if that makes him feel any better.  He has a history of only mild coronary artery disease so we may end up stopping metoprolol altogether and just continuing Entresto since Cad only mild. Euvolemic.    4/5/2023 echo reviewed.  EF 56 to 60%.  Diastolic function is consistent with grade 1 impaired  relaxation.    Continue current dose of Entresto        2. Coronary artery disease involving native heart without angina pectoris, unspecified vessel or lesion type  Assessment & Plan:  January 7, 2021 left heart cath with only mild coronary arthrosclerosis.  On medical therapy.  Stable.  No chest pain.      3. Dizziness and giddiness  Assessment & Plan:  he feels some dizziness when turning around.  He said this is especially apparent when he is on his boat and fell twice on his boat during his most recent camping trip.  We discussed staying hydrated, rising slowly, turning slowly, and to be even more cautious when in the water.  Him and his wife agree that he is an avid swimmer.  Blood pressure in clinic today is 112/60.  I feel he may be becoming hypotensive.  I have asked him to take half of his metoprolol 25 mg and see if that makes him feel any better.  He has a history of only mild coronary artery disease so we may end up stopping metoprolol altogether and just continuing Entresto.        Recommendations: ER if symptoms increase, Limitations of stress testing for definitive diagnosis reviewed, Sleep hygiene discussed, Limit salt, Elevate legs, Stop cigarettes, Limit caffeine and Report if any new/changing symptoms immediately          Follow Up  Return in about 2 months (around 6/5/2023) for BP.  Patient was given instructions and counseling regarding his condition or for health maintenance advice. Please see specific information pulled into the AVS if appropriate.

## 2023-04-05 NOTE — ASSESSMENT & PLAN NOTE
he feels some dizziness when turning around.  He said this is especially apparent when he is on his boat and fell twice on his boat during his most recent camping trip.  We discussed staying hydrated, rising slowly, turning slowly, and to be even more cautious when in the water.  Him and his wife agree that he is an avid swimmer.  Blood pressure in clinic today is 112/60.  I feel he may be becoming hypotensive.  I have asked him to take half of his metoprolol 25 mg and see if that makes him feel any better.  He has a history of only mild coronary artery disease so we may end up stopping metoprolol altogether and just continuing Entresto.

## 2023-04-05 NOTE — ASSESSMENT & PLAN NOTE
On one of his previous notes his EF was 35% at 1 point.  He is doing really well on increased dose of Entresto.  His only issue is that he feels some dizziness when turning around.  He said this is especially apparent when he is on his boat and fell twice on his boat during his most recent camping trip.  We discussed staying hydrated, rising slowly, turning slowly, and to be even more cautious when in the water.  Him and his wife agree that he is an avid swimmer.  Blood pressure in clinic today is 112/60.  I feel he may be becoming hypotensive.  I have asked him to take half of his metoprolol 25 mg and see if that makes him feel any better.  He has a history of only mild coronary artery disease so we may end up stopping metoprolol altogether and just continuing Entresto since Cad only mild. Euvolemic.    4/5/2023 echo reviewed.  EF 56 to 60%.  Diastolic function is consistent with grade 1 impaired relaxation.    Continue current dose of Entresto

## 2023-04-05 NOTE — ASSESSMENT & PLAN NOTE
January 7, 2021 left heart cath with only mild coronary arthrosclerosis.  On medical therapy.  Stable.  No chest pain.

## 2023-04-10 ENCOUNTER — OFFICE VISIT (OUTPATIENT)
Dept: ORTHOPEDIC SURGERY | Facility: CLINIC | Age: 83
End: 2023-04-10
Payer: MEDICARE

## 2023-04-10 VITALS
SYSTOLIC BLOOD PRESSURE: 110 MMHG | HEIGHT: 72 IN | BODY MASS INDEX: 38.52 KG/M2 | WEIGHT: 284.39 LBS | DIASTOLIC BLOOD PRESSURE: 80 MMHG

## 2023-04-10 DIAGNOSIS — M17.11 PRIMARY OSTEOARTHRITIS OF RIGHT KNEE: Primary | ICD-10-CM

## 2023-04-10 PROCEDURE — 1159F MED LIST DOCD IN RCRD: CPT | Performed by: ORTHOPAEDIC SURGERY

## 2023-04-10 PROCEDURE — 3074F SYST BP LT 130 MM HG: CPT | Performed by: ORTHOPAEDIC SURGERY

## 2023-04-10 PROCEDURE — 20610 DRAIN/INJ JOINT/BURSA W/O US: CPT | Performed by: ORTHOPAEDIC SURGERY

## 2023-04-10 PROCEDURE — 1160F RVW MEDS BY RX/DR IN RCRD: CPT | Performed by: ORTHOPAEDIC SURGERY

## 2023-04-10 PROCEDURE — 3079F DIAST BP 80-89 MM HG: CPT | Performed by: ORTHOPAEDIC SURGERY

## 2023-04-10 RX ORDER — TRIAMCINOLONE ACETONIDE 40 MG/ML
40 INJECTION, SUSPENSION INTRA-ARTICULAR; INTRAMUSCULAR
Status: COMPLETED | OUTPATIENT
Start: 2023-04-10 | End: 2023-04-10

## 2023-04-10 RX ORDER — ROPIVACAINE HYDROCHLORIDE 5 MG/ML
4 INJECTION, SOLUTION EPIDURAL; INFILTRATION; PERINEURAL
Status: COMPLETED | OUTPATIENT
Start: 2023-04-10 | End: 2023-04-10

## 2023-04-10 RX ADMIN — ROPIVACAINE HYDROCHLORIDE 4 ML: 5 INJECTION, SOLUTION EPIDURAL; INFILTRATION; PERINEURAL at 13:33

## 2023-04-10 RX ADMIN — TRIAMCINOLONE ACETONIDE 40 MG: 40 INJECTION, SUSPENSION INTRA-ARTICULAR; INTRAMUSCULAR at 13:33
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
## 2023-04-10 NOTE — PROGRESS NOTES
Procedure   - Large Joint Arthrocentesis: R knee on 4/10/2023 1:33 PM  Indications: pain  Details: 22 G needle, anterolateral approach  Medications: 4 mL ropivacaine 0.5 %; 40 mg triamcinolone acetonide 40 MG/ML  Outcome: tolerated well, no immediate complications  Procedure, treatment alternatives, risks and benefits explained, specific risks discussed. Consent was given by the patient. Immediately prior to procedure a time out was called to verify the correct patient, procedure, equipment, support staff and site/side marked as required. Patient was prepped and draped in the usual sterile fashion.

## 2023-06-06 ENCOUNTER — OFFICE VISIT (OUTPATIENT)
Dept: CARDIOLOGY | Facility: CLINIC | Age: 83
End: 2023-06-06
Payer: MEDICARE

## 2023-06-06 VITALS
BODY MASS INDEX: 39.42 KG/M2 | WEIGHT: 291 LBS | OXYGEN SATURATION: 98 % | HEART RATE: 68 BPM | SYSTOLIC BLOOD PRESSURE: 110 MMHG | DIASTOLIC BLOOD PRESSURE: 72 MMHG | HEIGHT: 72 IN

## 2023-06-06 DIAGNOSIS — I50.22 CHRONIC SYSTOLIC (CONGESTIVE) HEART FAILURE: Primary | ICD-10-CM

## 2023-06-06 DIAGNOSIS — R42 DIZZINESS AND GIDDINESS: ICD-10-CM

## 2023-06-06 RX ORDER — SACUBITRIL AND VALSARTAN 24; 26 MG/1; MG/1
1 TABLET, FILM COATED ORAL EVERY 12 HOURS SCHEDULED
COMMUNITY
Start: 2023-05-31 | End: 2023-06-06 | Stop reason: SDUPTHER

## 2023-06-06 RX ORDER — ATORVASTATIN CALCIUM 40 MG/1
40 TABLET, FILM COATED ORAL DAILY
Qty: 90 TABLET | Refills: 1 | Status: SHIPPED | OUTPATIENT
Start: 2023-06-06

## 2023-06-06 RX ORDER — TEMAZEPAM 30 MG/1
30 CAPSULE ORAL
COMMUNITY
Start: 2023-05-11

## 2023-06-06 RX ORDER — SACUBITRIL AND VALSARTAN 24; 26 MG/1; MG/1
1 TABLET, FILM COATED ORAL EVERY 12 HOURS SCHEDULED
Qty: 180 TABLET | Refills: 1 | Status: SHIPPED | OUTPATIENT
Start: 2023-06-06

## 2023-06-06 NOTE — ASSESSMENT & PLAN NOTE
Feels better with decreased dose of Entresto and decrease dose of metoprolol.  Blood pressure still 110/72 and some mild dizziness.  We will go ahead and stop low-dose of metoprolol and see patient back in 3 months.

## 2023-06-06 NOTE — PROGRESS NOTES
[facility] Provider Note     Date:   2023   Name: Gio Gutiérrez  :   1940  PCP: Javi Carlos MD    Chief Complaint   Patient presents with    Follow-up    Hypertension       Subjective     History of Present Illness  Gio Gutiérrez is a 83 y.o. male who presents today for follow-up on CHF and blood pressure.  He recently had to go to the ER for dizziness and hypotension.  Dr. Soto decreased his Entresto.  He is feeling much better.  However, his blood pressure is nonfat much higher than it was in the office last visit.  Last visit we decreased his metoprolol.  He said he feels 85% better.  Given his blood pressure still being 110/72 at home continue to have some dizziness we will go ahead and stop the metoprolol 25 mg half.  And continue the lower dose of Entresto.  We will repeat his echo in 6 months to make sure he does not have worsening of CHF with a lower dose of Entresto.    I would like to see patient back in 3 months to see how his dizziness and hypotension are doing.    Cardiology and sleep related problem list    CHF  Only mild CAD  Dizziness  BABATUNDE/depression  Hyperlipidemia  BPH    CHF-2023 EF 56 to 60%  Left heart cath-2021 and only with mild CAD  Carotid stenosis-2023 less than 50%    No Known Allergies    Current Outpatient Medications:     atorvastatin (LIPITOR) 40 MG tablet, Take 1 tablet by mouth Daily., Disp: 90 tablet, Rfl: 1    cetirizine (zyrTEC) 10 MG tablet, Take 1 tablet by mouth Daily., Disp: , Rfl: 1    citalopram (CeleXA) 20 MG tablet, Take 1 tablet by mouth Daily., Disp: , Rfl: 1    Entresto 24-26 MG tablet, Take 1 tablet by mouth Every 12 (Twelve) Hours., Disp: 180 tablet, Rfl: 1    finasteride (PROSCAR) 5 MG tablet, Take 1 tablet by mouth Daily., Disp: , Rfl: 2    ibuprofen (ADVIL,MOTRIN) 200 MG tablet, Take 1 tablet by mouth Every 6 (Six) Hours As Needed for Mild Pain., Disp: , Rfl:     omeprazole (priLOSEC) 20 MG capsule, , Disp: ,  "Rfl:     tamsulosin (FLOMAX) 0.4 MG capsule 24 hr capsule, Take 2 capsules by mouth Daily., Disp: , Rfl: 1    temazepam (RESTORIL) 30 MG capsule, Take 1 capsule by mouth every night at bedtime., Disp: , Rfl:     Past Medical History:   Diagnosis Date    CAD (coronary artery disease)     Cardiomegaly     Chronic systolic (congestive) heart failure     COVID 09/2022    Diverticulosis     Dizziness and giddiness     Essential (primary) hypertension     Hypercholesterolemia     Occlusion and stenosis of bilateral carotid arteries     Prostate disorder       Past Surgical History:   Procedure Laterality Date    CHOLECYSTECTOMY      HAND SURGERY      HERNIA REPAIR      KIDNEY STONE SURGERY       Family History   Problem Relation Age of Onset    Stroke Mother     Hypertension Mother     Heart attack Mother     Cancer Father         STOMACH    Diabetes Father     No Known Problems Sister      Social History     Socioeconomic History    Marital status:    Tobacco Use    Smoking status: Never    Smokeless tobacco: Never   Substance and Sexual Activity    Alcohol use: No    Drug use: No    Sexual activity: Defer       Objective     Vital Signs:  /72 (BP Location: Left arm)   Pulse 68   Ht 182.9 cm (72\")   Wt 132 kg (291 lb)   SpO2 98%   BMI 39.47 kg/m²   Estimated body mass index is 39.47 kg/m² as calculated from the following:    Height as of this encounter: 182.9 cm (72\").    Weight as of this encounter: 132 kg (291 lb).       [unfilled]    Physical Exam  Vitals reviewed.   Constitutional:       Appearance: Normal appearance.   Eyes:      Pupils: Pupils are equal, round, and reactive to light.   Neck:      Vascular: No carotid bruit.   Cardiovascular:      Rate and Rhythm: Normal rate and regular rhythm.      Pulses: Normal pulses.      Heart sounds: Normal heart sounds.   Pulmonary:      Effort: Pulmonary effort is normal.      Breath sounds: Normal breath sounds.   Musculoskeletal:         " General: Normal range of motion.      Right lower leg: No edema.      Left lower leg: No edema.   Skin:     General: Skin is warm and dry.   Neurological:      Mental Status: He is alert and oriented to person, place, and time.      Gait: Gait is intact.   Psychiatric:         Attention and Perception: Attention normal.         Mood and Affect: Mood normal.                   Assessment and Plan     Diagnoses and all orders for this visit:    1. Chronic systolic (congestive) heart failure (Primary)  Assessment & Plan:  Euvolemic.  Doing well on decreased dose of Entresto.  We will update echo in 6 months or so.      2. Dizziness and giddiness  Assessment & Plan:  Feels better with decreased dose of Entresto and decrease dose of metoprolol.  Blood pressure still 110/72 and some mild dizziness.  We will go ahead and stop low-dose of metoprolol and see patient back in 3 months.      Other orders  -     atorvastatin (LIPITOR) 40 MG tablet; Take 1 tablet by mouth Daily.  Dispense: 90 tablet; Refill: 1  -     Entresto 24-26 MG tablet; Take 1 tablet by mouth Every 12 (Twelve) Hours.  Dispense: 180 tablet; Refill: 1        Recommendations: ER if symptoms increase and Report if any new/changing symptoms immediately          Follow Up  Return in about 3 months (around 9/6/2023) for BP.  Patient was given instructions and counseling regarding his condition or for health maintenance advice. Please see specific information pulled into the AVS if appropriate.

## 2023-06-12 ENCOUNTER — TELEPHONE (OUTPATIENT)
Dept: CARDIOLOGY | Facility: CLINIC | Age: 83
End: 2023-06-12
Payer: MEDICARE

## 2023-06-12 NOTE — TELEPHONE ENCOUNTER
Spoke with Dr. Ayaz Gaines and [facility] emergency room.  Patient is there with episode of atrial fibrillation.  From what I can tell this is new for him.  He is recently been off of metoprolol for low blood pressures.  His last ejection fraction was normal so I am going to have him stop Entresto and resume a low-dose of metoprolol 12-1/2 twice daily.  He is also to take aspirin daily until our follow-up.  I will try to move his office visit appointment up for the next 1 to 2 weeks please thank you

## 2023-06-20 PROBLEM — R06.02 SOB (SHORTNESS OF BREATH): Status: ACTIVE | Noted: 2023-06-20

## 2023-06-20 PROBLEM — I48.0 PAROXYSMAL ATRIAL FIBRILLATION: Status: ACTIVE | Noted: 2023-06-20

## 2023-07-05 PROBLEM — G47.10 HYPERSOMNIA: Status: ACTIVE | Noted: 2023-07-05

## 2023-07-26 ENCOUNTER — TELEPHONE (OUTPATIENT)
Dept: CARDIOLOGY | Facility: CLINIC | Age: 83
End: 2023-07-26

## 2023-07-26 NOTE — TELEPHONE ENCOUNTER
Caller: ANDREAS ROWLEY    Relationship to patient: Emergency Contact    Best call back number: [phone]     Patient is needing: PTS WIFE REPORTS THAT PT IS STILL HAVING SHORTNESS OF BREATH, SEEMS THAT IT IS MOSTLY OFF AND ON THROUGHOUT THE DAY AND ON EXERTION - PT WAS AT PHYSICAL THERAPY YESTERDAY AND THEY SUGGESTED HE TRY TO GET AN INHALER FOR THIS - PT WONDERING IF AN INHALER WOULD BE HELPFUL?

## 2023-07-26 NOTE — TELEPHONE ENCOUNTER
"Called and spoke with pt and his wife.  He does not have any c/o palpitations.  States Cardiac Rehab told him yesterday that his heart rate and pulse ox levels were \"good\".  He is unsure if his SOA is better or worse.  Wife relates at last visit 7/5/2023; Metoprolol Tart 25mg was changed to two in the morning and at night.  They question if he needs an inhaler.  Please advise.  "

## 2023-07-28 RX ORDER — ALBUTEROL SULFATE 90 UG/1
2 AEROSOL, METERED RESPIRATORY (INHALATION) EVERY 6 HOURS PRN
Qty: 18 G | Refills: 1 | Status: SHIPPED | OUTPATIENT
Start: 2023-07-28

## 2023-07-28 NOTE — TELEPHONE ENCOUNTER
Informed patient of message below. Patient would like to try to inhaler to see if that will help with shortness of air. Patient would like it to be sent to [facility]. Informed patient to try inhaler over the week to see if symptoms improve and call us Monday morning to let us know.

## 2023-07-31 ENCOUNTER — TELEPHONE (OUTPATIENT)
Dept: CARDIOLOGY | Facility: CLINIC | Age: 83
End: 2023-07-31
Payer: MEDICARE

## 2023-08-08 ENCOUNTER — TELEPHONE (OUTPATIENT)
Dept: CARDIOLOGY | Facility: CLINIC | Age: 83
End: 2023-08-08

## 2023-08-08 ENCOUNTER — TELEPHONE (OUTPATIENT)
Dept: CARDIOLOGY | Facility: CLINIC | Age: 83
End: 2023-08-08
Payer: MEDICARE

## 2023-08-08 NOTE — TELEPHONE ENCOUNTER
Caller: ANDREAS ROWLEY    Relationship to patient: Emergency Contact    Best call back number: [phone]    Chief complaint: EXTREME SOB, VERY TRIED AND WEAK, NO ENERGY. HIS INHALER IS WORKING SOME, BUT THEY WORRY THE INCREASE IN MEDICATION MIGHT BE EFFECTING HIM.     Type of visit: FU    Requested date: ASAP     Additional notes:PT HAD AN APPT IN SEPTEMBER, BUT IT WAS CANCELED. THEY WOULD LIKE TO BE SEEN ASAP IF POSSIBLE.

## 2023-08-08 NOTE — TELEPHONE ENCOUNTER
Patient's wife called and left a voicemail stating they have some questions regarding a prescription. Please call back at [phone].

## 2023-08-09 ENCOUNTER — CLINICAL SUPPORT (OUTPATIENT)
Dept: CARDIOLOGY | Facility: CLINIC | Age: 83
End: 2023-08-09
Payer: MEDICARE

## 2023-08-09 ENCOUNTER — TELEPHONE (OUTPATIENT)
Dept: CARDIOLOGY | Facility: CLINIC | Age: 83
End: 2023-08-09

## 2023-08-09 VITALS
HEART RATE: 60 BPM | HEIGHT: 72 IN | DIASTOLIC BLOOD PRESSURE: 64 MMHG | OXYGEN SATURATION: 97 % | SYSTOLIC BLOOD PRESSURE: 116 MMHG | BODY MASS INDEX: 39.42 KG/M2 | WEIGHT: 291 LBS

## 2023-08-09 DIAGNOSIS — R06.02 SOB (SHORTNESS OF BREATH): ICD-10-CM

## 2023-08-09 DIAGNOSIS — I25.10 CORONARY ARTERY DISEASE INVOLVING NATIVE HEART WITHOUT ANGINA PECTORIS, UNSPECIFIED VESSEL OR LESION TYPE: Primary | ICD-10-CM

## 2023-08-09 DIAGNOSIS — I25.10 CORONARY ARTERY DISEASE INVOLVING NATIVE HEART WITHOUT ANGINA PECTORIS, UNSPECIFIED VESSEL OR LESION TYPE: ICD-10-CM

## 2023-08-09 NOTE — TELEPHONE ENCOUNTER
Pt called with troponin level of 11 with reference range of 0-76.  Order for heart cath has been sent in.  Advised to call in if he has not been contacted for date.  Strongly advised pt that if symptoms should change, increase or fail to resolve to promptly seek medical attention.  He verbalizes understanding.

## 2023-08-09 NOTE — PROGRESS NOTES
Patient and his son presented with complaints of increasing shortness of air.  He reports the inhaler is not helping.  He denies any chest pain.  EKG today sinus bradycardia with first-degree AV block and left bundle branch block.  We did try to increase his metoprolol slightly to better control his A-fib a few weeks ago but we did very cautiously due to his history of hypotension with beta-blocker increases.  Patient reports he has been sitting outside at his family owned greenhouse here in town.  He is not sure if the son and heat have affected his breathing.  SHAYNE Pena spoke with patient and his son and they are amendable to getting troponins at ER and scheduling a left heart cath.  We also advised patient and his son that if his symptoms get worse or he feels that they are unmanageable that he needs to go to the ER.  They both verbalized understanding.  /64.  Oxygen 97% on room air.  Heart rate 60.  Order for stat troponins and left heart cath have been placed.

## 2023-08-15 ENCOUNTER — APPOINTMENT (OUTPATIENT)
Dept: GENERAL RADIOLOGY | Facility: HOSPITAL | Age: 83
End: 2023-08-15
Payer: MEDICARE

## 2023-08-15 ENCOUNTER — HOSPITAL ENCOUNTER (OUTPATIENT)
Facility: HOSPITAL | Age: 83
Setting detail: HOSPITAL OUTPATIENT SURGERY
Discharge: HOME | End: 2023-08-15
Attending: INTERNAL MEDICINE | Admitting: INTERNAL MEDICINE
Payer: MEDICARE

## 2023-08-15 VITALS
TEMPERATURE: 97.5 F | SYSTOLIC BLOOD PRESSURE: 116 MMHG | DIASTOLIC BLOOD PRESSURE: 65 MMHG | OXYGEN SATURATION: 96 % | RESPIRATION RATE: 16 BRPM | BODY MASS INDEX: 39.88 KG/M2 | HEART RATE: 62 BPM | HEIGHT: 72 IN | WEIGHT: 294.4 LBS

## 2023-08-15 DIAGNOSIS — R06.02 SOB (SHORTNESS OF BREATH): ICD-10-CM

## 2023-08-15 DIAGNOSIS — I25.10 CORONARY ARTERY DISEASE INVOLVING NATIVE HEART WITHOUT ANGINA PECTORIS, UNSPECIFIED VESSEL OR LESION TYPE: ICD-10-CM

## 2023-08-15 LAB
ALBUMIN SERPL-MCNC: 3.7 G/DL (ref 3.5–5.2)
ALBUMIN/GLOB SERPL: 1.2 G/DL
ALP SERPL-CCNC: 111 U/L (ref 39–117)
ALT SERPL W P-5'-P-CCNC: 18 U/L (ref 1–41)
ANION GAP SERPL CALCULATED.3IONS-SCNC: 9 MMOL/L (ref 5–15)
AST SERPL-CCNC: 17 U/L (ref 1–40)
BILIRUB SERPL-MCNC: 0.8 MG/DL (ref 0–1.2)
BUN BLDA-MCNC: 10 MG/DL (ref 8–26)
BUN SERPL-MCNC: 10 MG/DL (ref 8–23)
BUN/CREAT SERPL: 12.8 (ref 7–25)
CA-I BLDA-SCNC: 1.23 MMOL/L (ref 1.2–1.32)
CALCIUM SPEC-SCNC: 8.8 MG/DL (ref 8.6–10.5)
CATH EF ESTIMATED: 40 %
CHLORIDE BLDA-SCNC: 100 MMOL/L (ref 98–109)
CHLORIDE SERPL-SCNC: 103 MMOL/L (ref 98–107)
CHOLEST SERPL-MCNC: 98 MG/DL (ref 0–200)
CO2 BLDA-SCNC: 26 MMOL/L (ref 24–29)
CO2 SERPL-SCNC: 27 MMOL/L (ref 22–29)
CREAT BLDA-MCNC: 0.9 MG/DL (ref 0.6–1.3)
CREAT SERPL-MCNC: 0.78 MG/DL (ref 0.76–1.27)
DEPRECATED RDW RBC AUTO: 41.1 FL (ref 37–54)
EGFRCR SERPLBLD CKD-EPI 2021: 84.2 ML/MIN/1.73
EGFRCR SERPLBLD CKD-EPI 2021: 87.9 ML/MIN/1.73
ERYTHROCYTE [DISTWIDTH] IN BLOOD BY AUTOMATED COUNT: 12 % (ref 12.3–15.4)
GLOBULIN UR ELPH-MCNC: 3 GM/DL
GLUCOSE BLDC GLUCOMTR-MCNC: 147 MG/DL (ref 70–130)
GLUCOSE SERPL-MCNC: 146 MG/DL (ref 65–99)
HBA1C MFR BLD: 6.7 % (ref 4.8–5.6)
HCT VFR BLD AUTO: 44.2 % (ref 37.5–51)
HCT VFR BLDA CALC: 42 % (ref 38–51)
HDLC SERPL-MCNC: 54 MG/DL (ref 40–60)
HGB BLD-MCNC: 14.6 G/DL (ref 13–17.7)
HGB BLDA-MCNC: 14.3 G/DL (ref 12–17)
LDLC SERPL CALC-MCNC: 26 MG/DL (ref 0–100)
LDLC/HDLC SERPL: 0.47 {RATIO}
MCH RBC QN AUTO: 30.5 PG (ref 26.6–33)
MCHC RBC AUTO-ENTMCNC: 33 G/DL (ref 31.5–35.7)
MCV RBC AUTO: 92.5 FL (ref 79–97)
PLATELET # BLD AUTO: 177 10*3/MM3 (ref 140–450)
PMV BLD AUTO: 11.9 FL (ref 6–12)
POTASSIUM BLDA-SCNC: 4.2 MMOL/L (ref 3.5–4.9)
POTASSIUM SERPL-SCNC: 4.4 MMOL/L (ref 3.5–5.2)
PROT SERPL-MCNC: 6.7 G/DL (ref 6–8.5)
RBC # BLD AUTO: 4.78 10*6/MM3 (ref 4.14–5.8)
SODIUM BLD-SCNC: 139 MMOL/L (ref 138–146)
SODIUM SERPL-SCNC: 139 MMOL/L (ref 136–145)
TRIGL SERPL-MCNC: 93 MG/DL (ref 0–150)
VLDLC SERPL-MCNC: 18 MG/DL (ref 5–40)
WBC NRBC COR # BLD: 5.8 10*3/MM3 (ref 3.4–10.8)

## 2023-08-15 PROCEDURE — 25010000002 HEPARIN (PORCINE) PER 1000 UNITS: Performed by: INTERNAL MEDICINE

## 2023-08-15 PROCEDURE — 80061 LIPID PANEL: CPT | Performed by: NURSE PRACTITIONER

## 2023-08-15 PROCEDURE — 83036 HEMOGLOBIN GLYCOSYLATED A1C: CPT | Performed by: NURSE PRACTITIONER

## 2023-08-15 PROCEDURE — 25010000002 MIDAZOLAM PER 1 MG: Performed by: INTERNAL MEDICINE

## 2023-08-15 PROCEDURE — 85014 HEMATOCRIT: CPT

## 2023-08-15 PROCEDURE — 85027 COMPLETE CBC AUTOMATED: CPT | Performed by: NURSE PRACTITIONER

## 2023-08-15 PROCEDURE — C1894 INTRO/SHEATH, NON-LASER: HCPCS | Performed by: INTERNAL MEDICINE

## 2023-08-15 PROCEDURE — 93458 L HRT ARTERY/VENTRICLE ANGIO: CPT | Performed by: INTERNAL MEDICINE

## 2023-08-15 PROCEDURE — 80047 BASIC METABLC PNL IONIZED CA: CPT

## 2023-08-15 PROCEDURE — 80053 COMPREHEN METABOLIC PANEL: CPT | Performed by: NURSE PRACTITIONER

## 2023-08-15 PROCEDURE — 71045 X-RAY EXAM CHEST 1 VIEW: CPT

## 2023-08-15 PROCEDURE — C1769 GUIDE WIRE: HCPCS | Performed by: INTERNAL MEDICINE

## 2023-08-15 PROCEDURE — 25010000002 FENTANYL CITRATE (PF) 50 MCG/ML SOLUTION: Performed by: INTERNAL MEDICINE

## 2023-08-15 PROCEDURE — 25510000001 IOPAMIDOL PER 1 ML: Performed by: INTERNAL MEDICINE

## 2023-08-15 RX ORDER — CARVEDILOL 6.25 MG/1
6.25 TABLET ORAL 2 TIMES DAILY
Qty: 60 TABLET | Refills: 11 | Status: SHIPPED | OUTPATIENT
Start: 2023-08-15

## 2023-08-15 RX ORDER — SENNOSIDES 8.6 MG
650 CAPSULE ORAL EVERY 8 HOURS PRN
COMMUNITY

## 2023-08-15 RX ORDER — SODIUM CHLORIDE 9 MG/ML
3 INJECTION, SOLUTION INTRAVENOUS CONTINUOUS
Status: ACTIVE | OUTPATIENT
Start: 2023-08-15 | End: 2023-08-15

## 2023-08-15 RX ORDER — SODIUM CHLORIDE 0.9 % (FLUSH) 0.9 %
10 SYRINGE (ML) INJECTION EVERY 12 HOURS SCHEDULED
Status: DISCONTINUED | OUTPATIENT
Start: 2023-08-15 | End: 2023-08-15 | Stop reason: HOSPADM

## 2023-08-15 RX ORDER — ONDANSETRON 2 MG/ML
4 INJECTION INTRAMUSCULAR; INTRAVENOUS EVERY 6 HOURS PRN
Status: DISCONTINUED | OUTPATIENT
Start: 2023-08-15 | End: 2023-08-15 | Stop reason: HOSPADM

## 2023-08-15 RX ORDER — FUROSEMIDE 40 MG/1
40 TABLET ORAL DAILY
Qty: 30 TABLET | Refills: 11 | Status: SHIPPED | OUTPATIENT
Start: 2023-08-15

## 2023-08-15 RX ORDER — ASPIRIN 325 MG
325 TABLET ORAL ONCE
Status: COMPLETED | OUTPATIENT
Start: 2023-08-15 | End: 2023-08-15

## 2023-08-15 RX ORDER — NICARDIPINE HCL-0.9% SOD CHLOR 1 MG/10 ML
SYRINGE (ML) INTRAVENOUS
Status: DISCONTINUED | OUTPATIENT
Start: 2023-08-15 | End: 2023-08-15 | Stop reason: HOSPADM

## 2023-08-15 RX ORDER — FENTANYL CITRATE 50 UG/ML
INJECTION, SOLUTION INTRAMUSCULAR; INTRAVENOUS
Status: DISCONTINUED | OUTPATIENT
Start: 2023-08-15 | End: 2023-08-15 | Stop reason: HOSPADM

## 2023-08-15 RX ORDER — SODIUM CHLORIDE 0.9 % (FLUSH) 0.9 %
1-10 SYRINGE (ML) INJECTION AS NEEDED
Status: DISCONTINUED | OUTPATIENT
Start: 2023-08-15 | End: 2023-08-15 | Stop reason: HOSPADM

## 2023-08-15 RX ORDER — MIDAZOLAM HYDROCHLORIDE 1 MG/ML
INJECTION INTRAMUSCULAR; INTRAVENOUS
Status: DISCONTINUED | OUTPATIENT
Start: 2023-08-15 | End: 2023-08-15 | Stop reason: HOSPADM

## 2023-08-15 RX ORDER — SODIUM CHLORIDE 9 MG/ML
40 INJECTION, SOLUTION INTRAVENOUS AS NEEDED
Status: DISCONTINUED | OUTPATIENT
Start: 2023-08-15 | End: 2023-08-15 | Stop reason: HOSPADM

## 2023-08-15 RX ORDER — ACETAMINOPHEN 325 MG/1
650 TABLET ORAL EVERY 4 HOURS PRN
Status: DISCONTINUED | OUTPATIENT
Start: 2023-08-15 | End: 2023-08-15 | Stop reason: HOSPADM

## 2023-08-15 RX ORDER — ASPIRIN 325 MG
325 TABLET, DELAYED RELEASE (ENTERIC COATED) ORAL DAILY
Status: DISCONTINUED | OUTPATIENT
Start: 2023-08-16 | End: 2023-08-15 | Stop reason: HOSPADM

## 2023-08-15 RX ORDER — LIDOCAINE HYDROCHLORIDE 10 MG/ML
INJECTION, SOLUTION EPIDURAL; INFILTRATION; INTRACAUDAL; PERINEURAL
Status: DISCONTINUED | OUTPATIENT
Start: 2023-08-15 | End: 2023-08-15 | Stop reason: HOSPADM

## 2023-08-15 RX ORDER — SACUBITRIL AND VALSARTAN 24; 26 MG/1; MG/1
1 TABLET, FILM COATED ORAL 2 TIMES DAILY
Qty: 60 TABLET | Refills: 3 | Status: SHIPPED | OUTPATIENT
Start: 2023-08-15

## 2023-08-15 RX ORDER — NITROGLYCERIN 0.4 MG/1
0.4 TABLET SUBLINGUAL
Status: DISCONTINUED | OUTPATIENT
Start: 2023-08-15 | End: 2023-08-15 | Stop reason: HOSPADM

## 2023-08-15 RX ORDER — HEPARIN SODIUM 1000 [USP'U]/ML
INJECTION, SOLUTION INTRAVENOUS; SUBCUTANEOUS
Status: DISCONTINUED | OUTPATIENT
Start: 2023-08-15 | End: 2023-08-15 | Stop reason: HOSPADM

## 2023-08-15 RX ADMIN — ASPIRIN 325 MG: 325 TABLET ORAL at 10:49

## 2023-08-15 RX ADMIN — SODIUM CHLORIDE 3 ML/KG/HR: 9 INJECTION, SOLUTION INTRAVENOUS at 10:49
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
## 2023-09-19 ENCOUNTER — OFFICE VISIT (OUTPATIENT)
Dept: CARDIOLOGY | Facility: CLINIC | Age: 83
End: 2023-09-19
Payer: MEDICARE

## 2023-09-19 ENCOUNTER — TELEPHONE (OUTPATIENT)
Dept: CARDIOLOGY | Facility: CLINIC | Age: 83
End: 2023-09-19
Payer: MEDICARE

## 2023-09-19 VITALS
DIASTOLIC BLOOD PRESSURE: 80 MMHG | BODY MASS INDEX: 38.74 KG/M2 | HEART RATE: 82 BPM | WEIGHT: 286 LBS | SYSTOLIC BLOOD PRESSURE: 142 MMHG | OXYGEN SATURATION: 98 % | HEIGHT: 72 IN

## 2023-09-19 DIAGNOSIS — I50.22 CHRONIC SYSTOLIC (CONGESTIVE) HEART FAILURE: Primary | ICD-10-CM

## 2023-09-19 DIAGNOSIS — R06.02 SOB (SHORTNESS OF BREATH): ICD-10-CM

## 2023-09-19 DIAGNOSIS — I10 ESSENTIAL HYPERTENSION: ICD-10-CM

## 2023-09-19 DIAGNOSIS — I48.0 PAROXYSMAL ATRIAL FIBRILLATION: ICD-10-CM

## 2023-09-19 PROCEDURE — 3079F DIAST BP 80-89 MM HG: CPT | Performed by: NURSE PRACTITIONER

## 2023-09-19 PROCEDURE — 99214 OFFICE O/P EST MOD 30 MIN: CPT | Performed by: NURSE PRACTITIONER

## 2023-09-19 PROCEDURE — 3077F SYST BP >= 140 MM HG: CPT | Performed by: NURSE PRACTITIONER

## 2023-09-19 RX ORDER — POTASSIUM CHLORIDE 20 MEQ/1
20 TABLET, EXTENDED RELEASE ORAL DAILY
Qty: 30 TABLET | Refills: 11 | Status: SHIPPED | OUTPATIENT
Start: 2023-09-19

## 2023-09-19 NOTE — TELEPHONE ENCOUNTER
"----- Message from Madai Dixon MA sent at 9/19/2023 12:49 PM EDT -----  Regarding: Cancellation of Order # [id number]  Order number [id number] for the procedure HOME SLEEP STUDY   [[id number]] has been canceled by Madai Dixon MA [[id number]].   This procedure was ordered by you on Jul 5, 2023 for the patient   Gio Gutiérrez [[id number]]. The reason for cancellation was \"Patient  Refused\".    This was a future order.  "

## 2023-09-20 NOTE — PROGRESS NOTES
[facility] Provider Note     Date:   2023   Name: Gio Gutiérrez  :   1940  PCP: Javi Carlos MD    Chief Complaint   Patient presents with    Atrial Fibrillation    Congestive Heart Failure     Heart Cath follow up     Shortness of Breath       Subjective     History of Present Illness  Gio Gutiérrez is a 83 y.o. male with past medical history of atrial fibrillation, CHF, hyperlipidemia and hypertension who presents today for follow-up on recent heart cath.  Recent work-up for chest pain and shortness of breath revealed an abnormal nuclear stress test.  A left heart cath was completed on 8/15/2023 that revealed no angiographically significant coronary artery disease, LVEF 40%.  He did well with the procedure with no complications.  He was discharged home and instructed to restart Entresto and Lasix.  Patient reports that shortness of breath has improved significantly since restarting the Entresto and Lasix.  He is not currently on a potassium supplement.  He is still feeling tired at times and occasionally experiences dizziness when standing.  He continues to decline a home sleep study to assess for [place].  Overall, patient is feeling much better at this time with no new concerns or symptoms today.    Cardiology/sleep history  1. Atrial fib- new onset 2023  2. CHF  3. Hyperlipidemia    Echocardiogram 2023-  ·  Left ventricular ejection fraction appears to be 56 - 60%.  ·  Left ventricular wall thickness is consistent with borderline concentric hypertrophy.  ·  Left ventricular diastolic function is consistent with (grade I) impaired relaxation.  ·  Estimated right ventricular systolic pressure from tricuspid regurgitation is normal (<35 mmHg).  ·  No significant regurgitation or stenosis on doppler.    [facility] 8/15/23-  ·  No angiographically significant coronary artery disease  ·  LVEF 40%  ·  Mild (2+) mitral regurgitation    Carotid duplex 2023- <50% stenosis  bilaterally.    Declined HST/PSG- 6/2023      No Known Allergies    Current Outpatient Medications:     acetaminophen (TYLENOL) 650 MG 8 hr tablet, Take 1 tablet by mouth Every 8 (Eight) Hours As Needed for Mild Pain., Disp: , Rfl:     albuterol sulfate  (90 Base) MCG/ACT inhaler, Inhale 2 puffs Every 6 (Six) Hours As Needed for Wheezing or Shortness of Air., Disp: 18 g, Rfl: 1    atorvastatin (LIPITOR) 40 MG tablet, Take 1 tablet by mouth Daily., Disp: 90 tablet, Rfl: 1    carvedilol (COREG) 6.25 MG tablet, Take 1 tablet by mouth 2 (Two) Times a Day., Disp: 60 tablet, Rfl: 11    cetirizine (zyrTEC) 10 MG tablet, Take 1 tablet by mouth Daily., Disp: , Rfl: 1    citalopram (CeleXA) 20 MG tablet, Take 1 tablet by mouth Daily., Disp: , Rfl: 1    Eliquis 5 MG tablet tablet, Take 1 tablet by mouth Every 12 (Twelve) Hours., Disp: 180 tablet, Rfl: 1    finasteride (PROSCAR) 5 MG tablet, Take 1 tablet by mouth Daily., Disp: , Rfl: 2    furosemide (LASIX) 40 MG tablet, Take 1 tablet by mouth Daily., Disp: 30 tablet, Rfl: 11    ibuprofen (ADVIL,MOTRIN) 200 MG tablet, Take 1 tablet by mouth Every 6 (Six) Hours As Needed for Mild Pain., Disp: , Rfl:     sacubitril-valsartan (Entresto) 24-26 MG tablet, Take 1 tablet by mouth 2 (Two) Times a Day., Disp: 60 tablet, Rfl: 3    tamsulosin (FLOMAX) 0.4 MG capsule 24 hr capsule, Take 2 capsules by mouth Daily., Disp: , Rfl: 1    temazepam (RESTORIL) 30 MG capsule, Take 1 capsule by mouth every night at bedtime., Disp: , Rfl:     potassium chloride (K-DUR,KLOR-CON) 20 MEQ CR tablet, Take 1 tablet by mouth Daily., Disp: 30 tablet, Rfl: 11    Past Medical History:   Diagnosis Date    CAD (coronary artery disease)     Cardiomegaly     Chronic systolic (congestive) heart failure     COVID 09/2022    Diverticulosis     Dizziness and giddiness     Hypercholesterolemia     Occlusion and stenosis of bilateral carotid arteries     Prostate disorder       Past Surgical History:   Procedure  "Laterality Date    CARDIAC CATHETERIZATION      08/15/2023  PER DR. JOY    CARDIAC CATHETERIZATION Left 8/15/2023    Procedure: Cardiac Catheterization/Vascular Study;  Surgeon: Paresh Joy MD;  Location: [facility] INVASIVE LOCATION;  Service: Cardiovascular;  Laterality: Left;    CHOLECYSTECTOMY      HAND SURGERY      HERNIA REPAIR      KIDNEY STONE SURGERY       Family History   Problem Relation Age of Onset    Stroke Mother     Hypertension Mother     Heart attack Mother     Cancer Father         STOMACH    Diabetes Father     No Known Problems Sister      Social History     Socioeconomic History    Marital status:    Tobacco Use    Smoking status: Never     Passive exposure: Never    Smokeless tobacco: Never   Vaping Use    Vaping Use: Never used   Substance and Sexual Activity    Alcohol use: No    Drug use: No    Sexual activity: Defer       Objective     Vital Signs:  /80   Pulse 82   Ht 182.9 cm (72\")   Wt 130 kg (286 lb)   SpO2 98%   BMI 38.79 kg/m²   Estimated body mass index is 38.79 kg/m² as calculated from the following:    Height as of this encounter: 182.9 cm (72\").    Weight as of this encounter: 130 kg (286 lb).               Physical Exam  Constitutional:       Appearance: Normal appearance. He is well-developed.   HENT:      Head: Normocephalic and atraumatic.   Eyes:      Pupils: Pupils are equal, round, and reactive to light.   Neck:      Vascular: No carotid bruit.   Cardiovascular:      Rate and Rhythm: Normal rate and regular rhythm.      Pulses: Normal pulses.      Heart sounds: Normal heart sounds. No murmur heard.  Pulmonary:      Breath sounds: Normal breath sounds. No wheezing or rhonchi.   Musculoskeletal:      Right lower leg: No edema.      Left lower leg: No edema.   Skin:     Capillary Refill: Capillary refill takes less than 2 seconds.      Coloration: Skin is not cyanotic.      Nails: There is no clubbing.   Neurological:      Mental Status: He is alert " and oriented to person, place, and time.      Motor: No weakness.      Gait: Gait normal.   Psychiatric:         Mood and Affect: Mood normal.         Behavior: Behavior is cooperative.         Thought Content: Thought content normal.         Cognition and Memory: Memory normal.         Cardiology studies reviewed: [facility]          Assessment and Plan     Diagnoses and all orders for this visit:    1. Chronic systolic (congestive) heart failure (Primary)  Assessment & Plan:  Recently restarted Entresto and Lasix and is feeling much better.  Shortness of breath has improved significantly.  Patient appears euvolemic on exam today.  - We will check BMP today.  - Add potassium 20 mEq once daily  - Continue Lasix 40 mg once daily  - Continue Entresto 24-26 mg twice a day    Orders:  -     Adult Transthoracic Echo Complete W/ Cont if Necessary Per Protocol; Future  -     Basic Metabolic Panel; Future  -     potassium chloride (K-DUR,KLOR-CON) 20 MEQ CR tablet; Take 1 tablet by mouth Daily.  Dispense: 30 tablet; Refill: 11    2. Paroxysmal atrial fibrillation  Assessment & Plan:  No recent episodes  - Continue Eliquis and carvedilol at current doses.      3. Essential hypertension  Assessment & Plan:  Hypertension is  stable .  Continue current treatment regimen.  Dietary sodium restriction.  Weight loss.  Blood pressure will be reassessed at the next regular appointment.      4. SOB (shortness of breath)  Assessment & Plan:  Shortness of breath has improved significantly since recently restarting Lasix and Entresto.  Left heart cath from 8/15/2023 revealed no significant coronary artery disease.          Recommendations: ER if symptoms increase and Report if any new/changing symptoms immediately          Follow Up  Return in about 3 months (around 12/19/2023) for CHF/Afib. Echo same day. .  Patient was given instructions and counseling regarding his condition or for health maintenance advice. Please see specific information  pulled into the AVS if appropriate.

## 2023-09-20 NOTE — ASSESSMENT & PLAN NOTE
Hypertension is  stable .  Continue current treatment regimen.  Dietary sodium restriction.  Weight loss.  Blood pressure will be reassessed at the next regular appointment.

## 2023-09-20 NOTE — ASSESSMENT & PLAN NOTE
Shortness of breath has improved significantly since recently restarting Lasix and Entresto.  Left heart cath from 8/15/2023 revealed no significant coronary artery disease.

## 2023-09-20 NOTE — ASSESSMENT & PLAN NOTE
Recently restarted Entresto and Lasix and is feeling much better.  Shortness of breath has improved significantly.  Patient appears euvolemic on exam today.  - We will check BMP today.  - Add potassium 20 mEq once daily  - Continue Lasix 40 mg once daily  - Continue Entresto 24-26 mg twice a day

## 2023-09-26 ENCOUNTER — TELEPHONE (OUTPATIENT)
Dept: CARDIOLOGY | Facility: CLINIC | Age: 83
End: 2023-09-26
Payer: MEDICARE

## 2023-09-26 DIAGNOSIS — I50.22 CHRONIC SYSTOLIC (CONGESTIVE) HEART FAILURE: ICD-10-CM

## 2023-09-26 NOTE — TELEPHONE ENCOUNTER
Will you let the patient know that his cholesterol levels are normal, kidney and liver function are normal, thyroid level is normal, blood counts are normal, folate is normal.  His hemoglobin A1c is elevated at 6.7 and his glucose was elevated at 129. I think that his PCP ordered these labs, I only ordered the BMP. So, he may already have these results. If not, he needs to follow up with his PCP regarding the elevated glucose and A1c. Thank you.

## 2023-12-13 ENCOUNTER — OFFICE VISIT (OUTPATIENT)
Dept: CARDIOLOGY | Facility: CLINIC | Age: 83
End: 2023-12-13
Payer: MEDICARE

## 2023-12-13 VITALS
SYSTOLIC BLOOD PRESSURE: 104 MMHG | BODY MASS INDEX: 38.19 KG/M2 | HEART RATE: 73 BPM | HEIGHT: 72 IN | DIASTOLIC BLOOD PRESSURE: 62 MMHG | WEIGHT: 282 LBS | OXYGEN SATURATION: 98 %

## 2023-12-13 DIAGNOSIS — R06.02 SOB (SHORTNESS OF BREATH): ICD-10-CM

## 2023-12-13 DIAGNOSIS — I10 ESSENTIAL HYPERTENSION: ICD-10-CM

## 2023-12-13 DIAGNOSIS — I50.22 CHRONIC SYSTOLIC (CONGESTIVE) HEART FAILURE: Primary | ICD-10-CM

## 2023-12-13 DIAGNOSIS — I48.0 PAROXYSMAL ATRIAL FIBRILLATION: ICD-10-CM

## 2023-12-13 RX ORDER — SACUBITRIL AND VALSARTAN 24; 26 MG/1; MG/1
1 TABLET, FILM COATED ORAL 2 TIMES DAILY
Qty: 60 TABLET | Refills: 3 | COMMUNITY
Start: 2023-12-13

## 2023-12-13 RX ORDER — ALBUTEROL SULFATE 90 UG/1
2 AEROSOL, METERED RESPIRATORY (INHALATION) EVERY 6 HOURS PRN
Qty: 18 G | Refills: 1 | Status: SHIPPED | OUTPATIENT
Start: 2023-12-13

## 2023-12-13 RX ORDER — CARVEDILOL 6.25 MG/1
3.12 TABLET ORAL 2 TIMES DAILY
Start: 2023-12-13 | End: 2023-12-18

## 2023-12-13 RX ORDER — APIXABAN 5 MG/1
5 TABLET, FILM COATED ORAL EVERY 12 HOURS SCHEDULED
Qty: 180 TABLET | Refills: 1 | COMMUNITY
Start: 2023-12-13

## 2023-12-13 NOTE — PROGRESS NOTES
[facility] Provider Note     Date:   2023   Name: Gio Gutiérrez  :   1940  PCP: Javi Carlos MD    Chief Complaint   Patient presents with    Atrial Fibrillation     Echo Results       Subjective     History of Present Illness  Gio Gutiérrez is a 83 y.o. male  with past medical history of atrial fibrillation, CHF, hyperlipidemia and hypertension  who presents today for follow-up visit and echo results.  Patient recently completed a cardiac workup for chest pain and shortness of breath.  He underwent a left heart cath on 8/15/2023 that revealed no angiographically significant coronary artery disease, LVEF 40%.  He was started on Entresto and Lasix at that time.  He is still experiencing fatigue and occasional dizziness when standing.  He also reports that in the last couple of weeks his blood pressure has been low.  Yesterday it was 82/60.  It is 104/62 today.  He denies chest pain or worsening shortness of breath.  His main symptom at this time is fatigue.  He continues to decline a home sleep study to assess for [place].      Cardiology/sleep history  1. Atrial fib- new onset 2023  2. CHF  3. Hyperlipidemia    Echocardiogram 2023-LVEF is 49%.  Left ventricular diastolic function is consistent with (grade 1) impaired relaxation.  Mild mitral valve stenosis.  Mild dilation of the sinuses of Valsalva.    [facility] 8/15/23-  ·  No angiographically significant coronary artery disease  ·  LVEF 40%  ·  Mild (2+) mitral regurgitation    Carotid duplex 2023- <50% stenosis bilaterally.    Declined HST/PSG- 2023     Reports Denies   Chest Pain [] [x]   Shortness of Air [x] []   Palpitations [] [x]   Edema [] [x]   Dizziness [x] []   Syncope [] [x]       No Known Allergies    Current Outpatient Medications:     acetaminophen (TYLENOL) 650 MG 8 hr tablet, Take 1 tablet by mouth Every 8 (Eight) Hours As Needed for Mild Pain., Disp: , Rfl:     albuterol sulfate  (90 Base) MCG/ACT  inhaler, Inhale 2 puffs Every 6 (Six) Hours As Needed for Wheezing or Shortness of Air., Disp: 18 g, Rfl: 1    atorvastatin (LIPITOR) 40 MG tablet, Take 1 tablet by mouth Daily., Disp: 90 tablet, Rfl: 1    carvedilol (COREG) 6.25 MG tablet, Take 0.5 tablets by mouth 2 (Two) Times a Day., Disp: , Rfl:     cetirizine (zyrTEC) 10 MG tablet, Take 1 tablet by mouth Daily., Disp: , Rfl: 1    citalopram (CeleXA) 20 MG tablet, Take 1 tablet by mouth Daily., Disp: , Rfl: 1    Eliquis 5 MG tablet tablet, Take 1 tablet by mouth Every 12 (Twelve) Hours., Disp: 180 tablet, Rfl: 1    finasteride (PROSCAR) 5 MG tablet, Take 1 tablet by mouth Daily., Disp: , Rfl: 2    furosemide (LASIX) 40 MG tablet, Take 1 tablet by mouth Daily., Disp: 30 tablet, Rfl: 11    potassium chloride (K-DUR,KLOR-CON) 20 MEQ CR tablet, Take 1 tablet by mouth Daily., Disp: 30 tablet, Rfl: 11    sacubitril-valsartan (Entresto) 24-26 MG tablet, Take 1 tablet by mouth 2 (Two) Times a Day., Disp: 60 tablet, Rfl: 3    tamsulosin (FLOMAX) 0.4 MG capsule 24 hr capsule, Take 2 capsules by mouth Daily., Disp: , Rfl: 1    temazepam (RESTORIL) 30 MG capsule, Take 1 capsule by mouth every night at bedtime., Disp: , Rfl:     Past Medical History:   Diagnosis Date    CAD (coronary artery disease)     Cardiomegaly     Chronic systolic (congestive) heart failure     COVID 09/2022    Diverticulosis     Dizziness and giddiness     Hypercholesterolemia     Occlusion and stenosis of bilateral carotid arteries     Prostate disorder       Past Surgical History:   Procedure Laterality Date    CARDIAC CATHETERIZATION      08/15/2023  PER DR. JOY    CARDIAC CATHETERIZATION Left 8/15/2023    Procedure: Cardiac Catheterization/Vascular Study;  Surgeon: Paresh Joy MD;  Location: [facility] CATH INVASIVE LOCATION;  Service: Cardiovascular;  Laterality: Left;    CHOLECYSTECTOMY      HAND SURGERY      HERNIA REPAIR      KIDNEY STONE SURGERY       Family History   Problem Relation  "Age of Onset    Stroke Mother     Hypertension Mother     Heart attack Mother     Cancer Father         STOMACH    Diabetes Father     No Known Problems Sister      Social History     Socioeconomic History    Marital status:    Tobacco Use    Smoking status: Never     Passive exposure: Never    Smokeless tobacco: Never   Vaping Use    Vaping Use: Never used   Substance and Sexual Activity    Alcohol use: No    Drug use: No    Sexual activity: Defer       Objective     Vital Signs:  /62   Pulse 73   Ht 182.9 cm (72\")   Wt 128 kg (282 lb)   SpO2 98%   BMI 38.25 kg/m²   Estimated body mass index is 38.25 kg/m² as calculated from the following:    Height as of this encounter: 182.9 cm (72\").    Weight as of this encounter: 128 kg (282 lb).               Physical Exam  Vitals reviewed.   Constitutional:       Appearance: Normal appearance.   HENT:      Head: Normocephalic.   Cardiovascular:      Rate and Rhythm: Normal rate and regular rhythm.      Heart sounds: Normal heart sounds.   Pulmonary:      Effort: Pulmonary effort is normal.      Breath sounds: Normal breath sounds.   Musculoskeletal:      Right lower leg: No edema.      Left lower leg: No edema.   Skin:     General: Skin is warm and dry.      Capillary Refill: Capillary refill takes less than 2 seconds.   Neurological:      General: No focal deficit present.      Mental Status: He is alert and oriented to person, place, and time.   Psychiatric:         Mood and Affect: Mood normal.         Behavior: Behavior normal.           Cardiology studies reviewed: Echocardiogram and recent labs from PCP reviewed          Assessment and Plan     Diagnoses and all orders for this visit:    1. Chronic systolic (congestive) heart failure (Primary)  Assessment & Plan:  Patient appears euvolemic on exam today.  Echocardiogram performed yesterday shows an improvement of LVEF to 49% (40% on [facility] from 8/20/2023).    - Continue Lasix, potassium and Entresto at " current doses.  - We will recheck BMP at next office visit if not completed by PCP.      2. Paroxysmal atrial fibrillation  Assessment & Plan:  No recent episodes.  - Continue Eliquis at current dose.  - Decrease carvedilol to 3.125 mg twice daily due to hypotension.    Orders:  -     Eliquis 5 MG tablet tablet; Take 1 tablet by mouth Every 12 (Twelve) Hours.  Dispense: 180 tablet; Refill: 1    3. Essential hypertension  Assessment & Plan:  Patient has been hypotensive, dizzy and fatigued.  - We will decrease carvedilol to 3.125 mg twice daily.  - Follow-up in 2 weeks to reassess.  - Continue monitoring BP at home.      4. SOB (shortness of breath)  Assessment & Plan:  Shortness of breath has improved significantly since restarting Lasix and Entresto.  Left heart cath from 8/15/2023 revealed no significant coronary artery disease.       Other orders  -     sacubitril-valsartan (Entresto) 24-26 MG tablet; Take 1 tablet by mouth 2 (Two) Times a Day.  Dispense: 60 tablet; Refill: 3  -     carvedilol (COREG) 6.25 MG tablet; Take 0.5 tablets by mouth 2 (Two) Times a Day.  -     albuterol sulfate  (90 Base) MCG/ACT inhaler; Inhale 2 puffs Every 6 (Six) Hours As Needed for Wheezing or Shortness of Air.  Dispense: 18 g; Refill: 1        Recommendations: ER if symptoms increase and Report if any new/changing symptoms immediately          Follow Up  Return in about 2 weeks (around 12/27/2023) for bp med change.  Patient was given instructions and counseling regarding his condition or for health maintenance advice. Please see specific information pulled into the AVS if appropriate.

## 2023-12-14 ENCOUNTER — TELEPHONE (OUTPATIENT)
Dept: CARDIOLOGY | Facility: CLINIC | Age: 83
End: 2023-12-14
Payer: MEDICARE

## 2023-12-14 NOTE — ASSESSMENT & PLAN NOTE
Shortness of breath has improved significantly since restarting Lasix and Entresto.  Left heart cath from 8/15/2023 revealed no significant coronary artery disease.

## 2023-12-14 NOTE — ASSESSMENT & PLAN NOTE
Patient has been hypotensive, dizzy and fatigued.  - We will decrease carvedilol to 3.125 mg twice daily.  - Follow-up in 2 weeks to reassess.  - Continue monitoring BP at home.

## 2023-12-14 NOTE — TELEPHONE ENCOUNTER
Gio wife called and said she is having a hard time cutting his Carvedilol in half. She said An let her know she would send in the smaller dose if she was having a hard time cutting them.

## 2023-12-14 NOTE — ASSESSMENT & PLAN NOTE
No recent episodes.  - Continue Eliquis at current dose.  - Decrease carvedilol to 3.125 mg twice daily due to hypotension.

## 2023-12-14 NOTE — ASSESSMENT & PLAN NOTE
Patient appears euvolemic on exam today.  Echocardiogram performed yesterday shows an improvement of LVEF to 49% (40% on [facility] from 8/20/2023).    - Continue Lasix, potassium and Entresto at current doses.  - We will recheck BMP at next office visit if not completed by PCP.

## 2023-12-18 RX ORDER — CARVEDILOL 3.12 MG/1
3.12 TABLET ORAL 2 TIMES DAILY
Qty: 60 TABLET | Refills: 3 | Status: SHIPPED | OUTPATIENT
Start: 2023-12-18

## 2023-12-27 ENCOUNTER — OFFICE VISIT (OUTPATIENT)
Dept: CARDIOLOGY | Facility: CLINIC | Age: 83
End: 2023-12-27
Payer: MEDICARE

## 2023-12-27 VITALS
DIASTOLIC BLOOD PRESSURE: 62 MMHG | WEIGHT: 286 LBS | HEART RATE: 65 BPM | BODY MASS INDEX: 38.74 KG/M2 | OXYGEN SATURATION: 98 % | SYSTOLIC BLOOD PRESSURE: 112 MMHG | HEIGHT: 72 IN

## 2023-12-27 DIAGNOSIS — I10 ESSENTIAL HYPERTENSION: Primary | ICD-10-CM

## 2023-12-27 DIAGNOSIS — I50.22 CHRONIC SYSTOLIC (CONGESTIVE) HEART FAILURE: ICD-10-CM

## 2023-12-27 DIAGNOSIS — R53.83 OTHER FATIGUE: ICD-10-CM

## 2023-12-27 NOTE — ASSESSMENT & PLAN NOTE
Patient appears euvolemic on exam today.  Echocardiogram from 12/12/23 shows an improvement of LVEF to 49% (40% on [facility] from 8/20/2023).     - Continue Lasix, potassium and Entresto at current doses.  - We will recheck BMP at next office visit if not completed by PCP.

## 2023-12-27 NOTE — ASSESSMENT & PLAN NOTE
Patient reports chronic fatigue.  He continues to decline a sleep study at this time.  He reports that he has been dealing with depression recently and feels like his symptoms are related to that.   He denies SI or HI.  He is currently taking Celexa but feels like he needs to increase the dosage.    -Recommend contacting PCP regarding increasing Celexa dose

## 2023-12-27 NOTE — PROGRESS NOTES
[facility] Provider Note     Date:   2023   Name: Gio Gutiérrez  :   1940  PCP: Javi Carlos MD    Chief Complaint   Patient presents with    Congestive Heart Failure       Subjective     History of Present Illness  Gio Gutiérrez is a 83 y.o. male  with past medical history of atrial fibrillation, CHF, hyperlipidemia and hypertension who presents today for follow-up on hypertension and recent medication change. Patient recently completed a cardiac workup for chest pain and shortness of breath.  He underwent a left heart cath on 8/15/2023 that revealed no angiographically significant coronary artery disease, LVEF 40%.  He was started on Entresto and Lasix at that time.  At last office visit on 2023, patient reported fatigue and dizziness.  He was also experiencing frequent episodes of hypotension, lowest reading was 82/60.  His BP at that time was 104/62.  Carvedilol dose was decreased to 3.125 mg twice daily.  He reports that since decreasing the carvedilol, he has not experienced any episodes of dizziness.  He still feels very fatigued at times.  He also reports that he has been dealing with depression recently and wonders if his symptoms are related to that. He denies SI or HI.  He is currently taking Celexa but feels like he needs to increase the dosage.  I recommended that he contact his PCP regarding increasing the dose of that medication.  Also reviewed labs from 2023, his A1c was 6.7 at that time.  I recommend that he follow-up with his PCP to discuss further treatment options.  Overall, patient is feeling better today with no new concerns or symptoms.    Cardiology/sleep history  1. Atrial fib- new onset 2023  2. CHF  3. Hyperlipidemia    Echocardiogram 2023-LVEF is 49%.  Left ventricular diastolic function is consistent with (grade 1) impaired relaxation.  Mild mitral valve stenosis.  Mild dilation of the sinuses of Valsalva.    [facility] 8/15/23-  ·   No angiographically significant coronary artery disease  ·  LVEF 40%  ·  Mild (2+) mitral regurgitation    Carotid duplex 1/4/2023- <50% stenosis bilaterally.    Declined HST/PSG- 6/2023      No Known Allergies    Current Outpatient Medications:     acetaminophen (TYLENOL) 650 MG 8 hr tablet, Take 1 tablet by mouth Every 8 (Eight) Hours As Needed for Mild Pain., Disp: , Rfl:     albuterol sulfate  (90 Base) MCG/ACT inhaler, Inhale 2 puffs Every 6 (Six) Hours As Needed for Wheezing or Shortness of Air., Disp: 18 g, Rfl: 1    atorvastatin (LIPITOR) 40 MG tablet, Take 1 tablet by mouth Daily., Disp: 90 tablet, Rfl: 1    carvedilol (COREG) 3.125 MG tablet, Take 1 tablet by mouth 2 (Two) Times a Day., Disp: 60 tablet, Rfl: 3    cetirizine (zyrTEC) 10 MG tablet, Take 1 tablet by mouth Daily., Disp: , Rfl: 1    citalopram (CeleXA) 20 MG tablet, Take 1 tablet by mouth Daily., Disp: , Rfl: 1    Eliquis 5 MG tablet tablet, Take 1 tablet by mouth Every 12 (Twelve) Hours., Disp: 180 tablet, Rfl: 1    finasteride (PROSCAR) 5 MG tablet, Take 1 tablet by mouth Daily., Disp: , Rfl: 2    furosemide (LASIX) 40 MG tablet, Take 1 tablet by mouth Daily., Disp: 30 tablet, Rfl: 11    potassium chloride (K-DUR,KLOR-CON) 20 MEQ CR tablet, Take 1 tablet by mouth Daily., Disp: 30 tablet, Rfl: 11    sacubitril-valsartan (Entresto) 24-26 MG tablet, Take 1 tablet by mouth 2 (Two) Times a Day., Disp: 60 tablet, Rfl: 3    tamsulosin (FLOMAX) 0.4 MG capsule 24 hr capsule, Take 2 capsules by mouth Daily., Disp: , Rfl: 1    temazepam (RESTORIL) 30 MG capsule, Take 1 capsule by mouth every night at bedtime., Disp: , Rfl:     Past Medical History:   Diagnosis Date    CAD (coronary artery disease)     Cardiomegaly     Chronic systolic (congestive) heart failure     COVID 09/2022    Diverticulosis     Dizziness and giddiness     Hypercholesterolemia     Occlusion and stenosis of bilateral carotid arteries     Prostate disorder       Past Surgical  "History:   Procedure Laterality Date    CARDIAC CATHETERIZATION      08/15/2023  PER DR. JOY    CARDIAC CATHETERIZATION Left 8/15/2023    Procedure: Cardiac Catheterization/Vascular Study;  Surgeon: Paresh Joy MD;  Location: [facility] INVASIVE LOCATION;  Service: Cardiovascular;  Laterality: Left;    CHOLECYSTECTOMY      HAND SURGERY      HERNIA REPAIR      KIDNEY STONE SURGERY       Family History   Problem Relation Age of Onset    Stroke Mother     Hypertension Mother     Heart attack Mother     Cancer Father         STOMACH    Diabetes Father     No Known Problems Sister      Social History     Socioeconomic History    Marital status:    Tobacco Use    Smoking status: Never     Passive exposure: Never    Smokeless tobacco: Never   Vaping Use    Vaping Use: Never used   Substance and Sexual Activity    Alcohol use: No    Drug use: No    Sexual activity: Defer       Objective     Vital Signs:  /62   Pulse 65   Ht 182.9 cm (72\")   Wt 130 kg (286 lb)   SpO2 98%   BMI 38.79 kg/m²   Estimated body mass index is 38.79 kg/m² as calculated from the following:    Height as of this encounter: 182.9 cm (72\").    Weight as of this encounter: 130 kg (286 lb).               Physical Exam  Vitals reviewed.   Constitutional:       Appearance: Normal appearance.   HENT:      Head: Normocephalic.   Cardiovascular:      Rate and Rhythm: Normal rate and regular rhythm.      Heart sounds: Normal heart sounds.   Pulmonary:      Effort: Pulmonary effort is normal.      Breath sounds: Normal breath sounds.   Musculoskeletal:      Right lower leg: No edema.      Left lower leg: No edema.   Skin:     General: Skin is warm and dry.      Capillary Refill: Capillary refill takes less than 2 seconds.   Neurological:      General: No focal deficit present.      Mental Status: He is alert and oriented to person, place, and time.   Psychiatric:         Mood and Affect: Mood normal.         Behavior: Behavior normal. "                     Assessment and Plan     Diagnoses and all orders for this visit:    1. Essential hypertension (Primary)  Assessment & Plan:  Patient was previously having episodes of hypotension and dizziness.  Carvedilol dose was decreased to 3.125 mg twice daily at last office visit 2 weeks ago.  Dizziness has resolved since decreasing carvedilol dose.  - Continue current medical therapy  - Continue monitoring BP at home      2. Chronic systolic (congestive) heart failure  Assessment & Plan:  Patient appears euvolemic on exam today.  Echocardiogram from 12/12/23 shows an improvement of LVEF to 49% (40% on LHC from 8/20/2023).     - Continue Lasix, potassium and Entresto at current doses.  - We will recheck BMP at next office visit if not completed by PCP.      3. Other fatigue  Assessment & Plan:  Patient reports chronic fatigue.  He continues to decline a sleep study at this time.  He reports that he has been dealing with depression recently and feels like his symptoms are related to that.   He denies SI or HI.  He is currently taking Celexa but feels like he needs to increase the dosage.    -Recommend contacting PCP regarding increasing Celexa dose             Recommendations: ER if symptoms increase and Report if any new/changing symptoms immediately          Follow Up  Return in about 3 months (around 3/27/2024) for CHF, Afib.  Patient was given instructions and counseling regarding his condition or for health maintenance advice. Please see specific information pulled into the AVS if appropriate.

## 2024-01-08 RX ORDER — SACUBITRIL AND VALSARTAN 24; 26 MG/1; MG/1
1 TABLET, FILM COATED ORAL 2 TIMES DAILY
Qty: 60 TABLET | Refills: 3 | Status: SHIPPED | OUTPATIENT
Start: 2024-01-08

## 2024-01-08 RX ORDER — ATORVASTATIN CALCIUM 40 MG/1
40 TABLET, FILM COATED ORAL DAILY
Qty: 90 TABLET | Refills: 1 | Status: SHIPPED | OUTPATIENT
Start: 2024-01-08

## 2024-01-29 DIAGNOSIS — I48.0 PAROXYSMAL ATRIAL FIBRILLATION: ICD-10-CM

## 2024-01-29 RX ORDER — APIXABAN 5 MG/1
5 TABLET, FILM COATED ORAL EVERY 12 HOURS SCHEDULED
Qty: 180 TABLET | Refills: 1 | Status: SHIPPED | OUTPATIENT
Start: 2024-01-29

## 2024-02-08 ENCOUNTER — OFFICE VISIT (OUTPATIENT)
Dept: CARDIOLOGY | Facility: CLINIC | Age: 84
End: 2024-02-08
Payer: MEDICARE

## 2024-02-08 VITALS
BODY MASS INDEX: 38.33 KG/M2 | HEART RATE: 76 BPM | DIASTOLIC BLOOD PRESSURE: 64 MMHG | OXYGEN SATURATION: 98 % | WEIGHT: 283 LBS | HEIGHT: 72 IN | SYSTOLIC BLOOD PRESSURE: 124 MMHG

## 2024-02-08 DIAGNOSIS — I49.3 PVC (PREMATURE VENTRICULAR CONTRACTION): ICD-10-CM

## 2024-02-08 DIAGNOSIS — R06.02 SOB (SHORTNESS OF BREATH): Primary | ICD-10-CM

## 2024-02-08 DIAGNOSIS — I10 ESSENTIAL HYPERTENSION: ICD-10-CM

## 2024-02-08 DIAGNOSIS — I50.22 CHRONIC SYSTOLIC (CONGESTIVE) HEART FAILURE: ICD-10-CM

## 2024-02-08 DIAGNOSIS — I48.0 PAROXYSMAL ATRIAL FIBRILLATION: ICD-10-CM

## 2024-02-08 NOTE — ASSESSMENT & PLAN NOTE
[facility]    [address] [address] [place] [id number]    Phone:  [phone]    Fax:  [phone]       Thank You for choosing us for your health care visit. We are glad to serve you and happy to provide you with this summary of your visit. Please help us to ensure we have accurate records. If you find anything that needs to be changed, please let our staff know as soon as possible.          Your Demographic Information     Patient Name Sex Deep Khoury Jd Male 1997       Ethnic Group Patient Race    Not of  or  Origin White      Your Visit Details     Date & Time Provider Department    2017 9:00 AM Albert Looney MD [facility]      Your To Do List     Future Orders Please Complete On or Around Expires    STRESS TEST        Conditions Discussed Today or Order-Related Diagnoses        Comments    Atypical chest pain    -  Primary       Your Vitals Were     BP Pulse Resp Height Weight BMI    134/88 64 20 5' 11\" (1.803 m) 221 lb (100.2 kg) 30.82 kg/m2    Smoking Status                   Never Smoker           Medications Prescribed or Re-Ordered Today     None      Allergies     No Known Allergies      Immunizations History as of 2017     Name Date    DTaP/HIB/IPV 1997, 1996    DTaP/IPV 2002, 1997    Dtap/hib 1998    HEPATITIS A CHILD 10/14/2008, 2006    HIB 8/10/2000    Hepatitis B Child 1997, 1996, 1996    Influenza 10/14/2008, 11/15/2005    MMR 2002, 10/28/1997    Meningococcal Conjugate MCV4P (Menactra) 10/14/2008    Td:Adult type tetanus/diphtheria 10/14/2008    Varicella 10/14/2008, 10/28/1997              Patient Instructions    Stress test scheduling [id number]  Stress Echocardiography (Echo)  Stress echocardiography is also called stress echo. It is a test that records pictures of your heart before and after you exercise. During activity, your heart has  Blood pressure 124/64.  This is well-controlled.    He is currently on:  Carvedilol 3.125 mg twice daily  Lasix 40 mg daily       to work harder to send oxygen-rich blood throughout your body. This test helps your doctor see whether your heart is working well when you are active. Allow at least 1 to 2 hours for the test from the time you arrive to when you can leave.    Before your test  · When you schedule your test, mention all medicines you take. Ask if it’s OK to take them before your test.  · For 4 hours before the test, don’t eat or drink. Don't drink any caffeine for 12 hours before the test, or as directed by your healthcare provider. If you use tobacco, you may also be asked not to use it the day of the test.  · Wear flat, comfortable walking shoes.  · Wear a top you can take off easily. You may be asked to remove your clothing from the waist up. If you are a woman, you will be given a hospital gown to wear.  During your test  Echo uses a small device that makes sound waves (transducer). It sends images of your heart to a video monitor. The images are recorded:  · A special gel is put on your chest. The transducer is moved over the gel. This records your heart at rest.  · Your blood pressure is taken. An electrocardiogram (ECG) is also done. This test involves small pads called electrodes. They are placed on your chest. The ECG reads the pattern of your heartbeat.  · Next, you’re asked to walk on a treadmill or pedal a stationary bike. You’ll do this until your heart beats rapidly. Note: If you are not able to exercise, you’ll be given a medicine to get your heart working harder.  · You stop exercising. Right away, the transducer is used to take a second set of video images of your heart. Your doctor compares these images to the first ones taken.  During the test, be sure to tell the healthcare provider if you feel any chest, arm, or jaw discomfort. Also mention if you have severe shortness of breath, feel very tired, or feel dizzy.  After the test  You can go back to your normal daily routine. Be sure to keep your follow-up  appointment with your doctor to discuss test results.     Your next appointment is: _______________________     © 7915-5815 [employer] [address] [place] [address], [place] [address]. All rights reserved. This information is not intended as a substitute for professional medical care. Always follow your healthcare professional's instructions.

## 2024-02-08 NOTE — ASSESSMENT & PLAN NOTE
PVCs noted on EKG today.    Patient has a new and increasing complaint of shortness of breath and feeling like he needs to take a deep breath.  Worse when he lays down.    Plan:    Heart monitor x 7 days to evaluate in A-fib and or PVC burden.    Noted that recently his carvedilol was decreased to 3.125 mg twice daily.  Beta-blocker decreased may contribute to increased PVCs or possible breakthrough A-fib.    Short follow-up to review monitor findings.

## 2024-02-08 NOTE — ASSESSMENT & PLAN NOTE
Patient denies any recent episodes of palpitations or concerns for atrial fibrillation.    He is on beta-blocker carvedilol 3.125 mg twice daily for rate control.    He is on Eliquis 5 mg twice daily for anticoagulation.  He reports he has not missed any medications.

## 2024-02-08 NOTE — PROGRESS NOTES
[facility] Provider Note     Date:   2024   Name: Gio Gutiérrez  :   1940  PCP: Javi Carlos MD    Chief Complaint   Patient presents with    Hypertension    Shortness of Breath     Pt called in requesting follow up d/t increasing shortness of breath; more so at  night. Noted symptoms have increased over the course of the last couple of weeks.       Subjective     History of Present Illness  Gio Gutiérrez is a 83 y.o. male who presents today for a new complaint x 1 month of feeling shortness of air with exertion, shortness of air when he lays down.  He is sleeping in a reclining chair.  Complaints of feeling his abdomen is full.    He denies any weight gain or edema in his bilateral lower extremities.  He denies any chest pain, dizziness, palpitations, syncope.    He has a known history of congestive heart failure with EF 49% on Entresto and Lasix.  At his last visit in December his beta-blocker carvedilol was decreased to 3.125 mg twice a day due to dizziness.  He reports dizziness has resolved.    Cardiology/sleep history  1. Atrial fib- new onset 2023  2. CHF  3. Hyperlipidemia    Echocardiogram 2023-LVEF is 49%.  Left ventricular diastolic function is consistent with (grade 1) impaired relaxation.  Mild mitral valve stenosis.  Mild dilation of the sinuses of Valsalva.    C 8/15/23-  ·  No angiographically significant coronary artery disease  ·  LVEF 40%  ·  Mild (2+) mitral regurgitation    Carotid duplex 2023- <50% stenosis bilaterally.    Declined HST/PSG- 2023     Reports Denies   Chest Pain [] [x]   Shortness of Air [x] []   Palpitations [] [x]   Edema [] [x]   Dizziness [] [x]   Syncope [] [x]       No Known Allergies    Current Outpatient Medications:     acetaminophen (TYLENOL) 650 MG 8 hr tablet, Take 1 tablet by mouth Every 8 (Eight) Hours As Needed for Mild Pain., Disp: , Rfl:     albuterol sulfate  (90 Base) MCG/ACT inhaler, Inhale 2 puffs  Every 6 (Six) Hours As Needed for Wheezing or Shortness of Air., Disp: 18 g, Rfl: 1    atorvastatin (LIPITOR) 40 MG tablet, TAKE 1 TABLET BY MOUTH EVERY DAY, Disp: 90 tablet, Rfl: 1    carvedilol (COREG) 3.125 MG tablet, Take 1 tablet by mouth 2 (Two) Times a Day., Disp: 60 tablet, Rfl: 3    citalopram (CeleXA) 20 MG tablet, Take 1 tablet by mouth Daily., Disp: , Rfl: 1    Eliquis 5 MG tablet tablet, Take 1 tablet by mouth Every 12 (Twelve) Hours., Disp: 180 tablet, Rfl: 1    finasteride (PROSCAR) 5 MG tablet, Take 1 tablet by mouth Daily., Disp: , Rfl: 2    furosemide (LASIX) 40 MG tablet, Take 1 tablet by mouth Daily., Disp: 30 tablet, Rfl: 11    potassium chloride (K-DUR,KLOR-CON) 20 MEQ CR tablet, Take 1 tablet by mouth Daily., Disp: 30 tablet, Rfl: 11    sacubitril-valsartan (Entresto) 24-26 MG tablet, Take 1 tablet by mouth 2 (Two) Times a Day., Disp: 60 tablet, Rfl: 3    tamsulosin (FLOMAX) 0.4 MG capsule 24 hr capsule, Take 2 capsules by mouth Daily., Disp: , Rfl: 1    temazepam (RESTORIL) 30 MG capsule, Take 1 capsule by mouth every night at bedtime., Disp: , Rfl:     Past Medical History:   Diagnosis Date    CAD (coronary artery disease)     Cardiomegaly     Chronic systolic (congestive) heart failure     COVID 09/2022    Diverticulosis     Dizziness and giddiness     Hypercholesterolemia     Occlusion and stenosis of bilateral carotid arteries     Prostate disorder       Past Surgical History:   Procedure Laterality Date    CARDIAC CATHETERIZATION      08/15/2023  PER DR. JOY    CARDIAC CATHETERIZATION Left 8/15/2023    Procedure: Cardiac Catheterization/Vascular Study;  Surgeon: Paresh Joy MD;  Location: [facility] INVASIVE LOCATION;  Service: Cardiovascular;  Laterality: Left;    CHOLECYSTECTOMY      HAND SURGERY      HERNIA REPAIR      KIDNEY STONE SURGERY       Family History   Problem Relation Age of Onset    Stroke Mother     Hypertension Mother     Heart attack Mother     Cancer Father   "       STOMACH    Diabetes Father     No Known Problems Sister      Social History     Socioeconomic History    Marital status:    Tobacco Use    Smoking status: Never     Passive exposure: Never    Smokeless tobacco: Never   Vaping Use    Vaping Use: Never used   Substance and Sexual Activity    Alcohol use: No    Drug use: No    Sexual activity: Defer       Objective     Vital Signs:  /64 (BP Location: Right arm, Patient Position: Sitting, Cuff Size: Adult)   Pulse 76   Ht 182.9 cm (72\")   Wt 128 kg (283 lb)   SpO2 98%   BMI 38.38 kg/m²   Estimated body mass index is 38.38 kg/m² as calculated from the following:    Height as of this encounter: 182.9 cm (72\").    Weight as of this encounter: 128 kg (283 lb).               Physical Exam  Constitutional:       Appearance: Normal appearance. He is well-developed. He is obese.   HENT:      Head: Normocephalic and atraumatic.      Nose: Nose normal.      Mouth/Throat:      Mouth: Mucous membranes are moist.   Eyes:      General: No scleral icterus.     Pupils: Pupils are equal, round, and reactive to light.   Neck:      Vascular: No carotid bruit.   Cardiovascular:      Rate and Rhythm: Normal rate and regular rhythm.      Pulses: Normal pulses.           Radial pulses are 2+ on the right side and 2+ on the left side.        Dorsalis pedis pulses are 2+ on the right side and 2+ on the left side.        Posterior tibial pulses are 2+ on the right side and 2+ on the left side.      Heart sounds: Normal heart sounds. No murmur heard.  Pulmonary:      Effort: Pulmonary effort is normal.      Breath sounds: Normal breath sounds. No wheezing or rhonchi.   Abdominal:      General: Bowel sounds are normal.   Musculoskeletal:      Cervical back: Neck supple.      Right lower leg: No edema.      Left lower leg: No edema.   Skin:     General: Skin is warm and dry.      Capillary Refill: Capillary refill takes less than 2 seconds.      Coloration: Skin is not " cyanotic.      Nails: There is no clubbing.   Neurological:      Mental Status: He is alert and oriented to person, place, and time.      Motor: No weakness.      Gait: Gait normal.   Psychiatric:         Mood and Affect: Mood normal.         Behavior: Behavior normal. Behavior is cooperative.         Thought Content: Thought content normal.         Cognition and Memory: Memory normal.                 ECG 12 Lead    Date/Time: 2024 5:48 PM  Performed by: Mckenna Zuniga APRN    Authorized by: Mckenna Zuniga APRN  Comparison: compared with previous ECG from 2023  Comparison to previous EC2023 EKG sinus bradycardia heart rate 55 with first-degree AV block  Rhythm: sinus rhythm  Ectopy: unifocal PVCs  Rate: normal  BPM: 65  Conduction: 1st degree AV block and non-specific intraventricular conduction delay  ST Segments: ST segments normal  T Waves: T waves normal  Other: no other findings    Clinical impression: abnormal EKG           Assessment and Plan     Diagnoses and all orders for this visit:    1. SOB (shortness of breath) (Primary)  Assessment & Plan:  He reports an increase of shortness of air for about 1 month.  Reports shortness of air with exertional activity.  And worse when he lays down.  He is sleeping in a reclining chair.  Reports that his abdomen feels full.  Noted he is having regular bowel movements.  Reports urinary output has been very good with Lasix.    Plan:    Check a chest x-ray  Check a BNP and BMP  Increase Lasix 40 mg to 80 mg x 1 dose  Increase potassium 20 mq to 40mq x 1 dose then resume both Lasix and potassium at normal dose.  Check overnight pulse oximeter    Short follow-up to review the studies and reassess shortness of air.    Orders:  -     XR Chest 2 View; Future  -     Overnight Sleep Oximetry Study; Future  -     XR Chest 2 View  -     ECG 12 Lead    2. Chronic systolic (congestive) heart failure  Assessment & Plan:  Known history of low EF noted 49%n  last echo 12/12/2023.    Last [facility] EF was 40% on 8/20/2023.    He is on Entresto 24/26 mg twice daily, Lasix 40 mg daily and beta-blocker carvedilol 3.125 mg twice daily.    He does not appear to have any edema or weight gain.  But he does have an increased complaint of shortness of air.  Feels like he cannot get a good breath.    He reports that he feels like his abdomen is full.    Plan:    Chest x-ray  BNP and BMP  May increase Lasix to 2 pills tomorrow morning and then resume normal 1 Lasix daily.    Short follow-up    Orders:  -     Basic Metabolic Panel; Future  -     BNP; Future  -     XR Chest 2 View; Future  -     Overnight Sleep Oximetry Study; Future  -     XR Chest 2 View  -     BNP  -     Basic Metabolic Panel    3. PVC (premature ventricular contraction)  Assessment & Plan:  PVCs noted on EKG today.    Patient has a new and increasing complaint of shortness of breath and feeling like he needs to take a deep breath.  Worse when he lays down.    Plan:    Heart monitor x 7 days to evaluate in A-fib and or PVC burden.    Noted that recently his carvedilol was decreased to 3.125 mg twice daily.  Beta-blocker decreased may contribute to increased PVCs or possible breakthrough A-fib.    Short follow-up to review monitor findings.    Orders:  -     Holter Monitor - 72 Hour Up To 15 Days    4. Paroxysmal atrial fibrillation  Assessment & Plan:  Patient denies any recent episodes of palpitations or concerns for atrial fibrillation.    He is on beta-blocker carvedilol 3.125 mg twice daily for rate control.    He is on Eliquis 5 mg twice daily for anticoagulation.  He reports he has not missed any medications.    Orders:  -     Holter Monitor - 72 Hour Up To 15 Days    5. Essential hypertension  Assessment & Plan:  Blood pressure 124/64.  This is well-controlled.    He is currently on:  Carvedilol 3.125 mg twice daily  Lasix 40 mg daily              Recommendations: ER if symptoms increase and Report if any  new/changing symptoms immediately          Follow Up  Return in about 3 weeks (around 2/29/2024) for SOA/ results labs/pulse ox/ monitor.  Patient was given instructions and counseling regarding his condition or for health maintenance advice. Please see specific information pulled into the AVS if appropriate.

## 2024-02-08 NOTE — ASSESSMENT & PLAN NOTE
Known history of low EF noted 49%n last echo 12/12/2023.    Last [facility] EF was 40% on 8/20/2023.    He is on Entresto 24/26 mg twice daily, Lasix 40 mg daily and beta-blocker carvedilol 3.125 mg twice daily.    He does not appear to have any edema or weight gain.  But he does have an increased complaint of shortness of air.  Feels like he cannot get a good breath.    He reports that he feels like his abdomen is full.    Plan:    Chest x-ray  BNP and BMP  May increase Lasix to 2 pills tomorrow morning and then resume normal 1 Lasix daily.    Short follow-up

## 2024-02-08 NOTE — ASSESSMENT & PLAN NOTE
He reports an increase of shortness of air for about 1 month.  Reports shortness of air with exertional activity.  And worse when he lays down.  He is sleeping in a reclining chair.  Reports that his abdomen feels full.  Noted he is having regular bowel movements.  Reports urinary output has been very good with Lasix.    Plan:    Check a chest x-ray  Check a BNP and BMP  Increase Lasix 40 mg to 80 mg x 1 dose  Increase potassium 20 mq to 40mq x 1 dose then resume both Lasix and potassium at normal dose.  Check overnight pulse oximeter    Short follow-up to review the studies and reassess shortness of air.

## 2024-02-12 ENCOUNTER — TELEPHONE (OUTPATIENT)
Dept: CARDIOLOGY | Facility: CLINIC | Age: 84
End: 2024-02-12
Payer: MEDICARE

## 2024-02-12 ENCOUNTER — TELEPHONE (OUTPATIENT)
Dept: CARDIOLOGY | Facility: CLINIC | Age: 84
End: 2024-02-12

## 2024-02-12 NOTE — TELEPHONE ENCOUNTER
Caller: ANDREAS ROWLEY    Relationship: Emergency Contact    Best call back number: [phone]     What is the best time to reach you: ANYTIME    Who are you requesting to speak with (clinical staff, provider,  specific staff member): CLINICAL STAFF    Do you know the name of the person who called: ANDREAS ROWLEY    What was the call regarding: PATIENT CANNOT GET THE MONITOR TO WORK CORRECTLY. HE IS NEEDING A CALL BACK WITH FURTHER ADVISEMENT.

## 2024-02-14 DIAGNOSIS — I50.22 CHRONIC SYSTOLIC (CONGESTIVE) HEART FAILURE: ICD-10-CM

## 2024-02-14 DIAGNOSIS — R06.02 SOB (SHORTNESS OF BREATH): ICD-10-CM

## 2024-02-19 ENCOUNTER — OFFICE VISIT (OUTPATIENT)
Dept: CARDIOLOGY | Facility: CLINIC | Age: 84
End: 2024-02-19
Payer: MEDICARE

## 2024-02-19 VITALS
DIASTOLIC BLOOD PRESSURE: 82 MMHG | SYSTOLIC BLOOD PRESSURE: 100 MMHG | OXYGEN SATURATION: 97 % | HEIGHT: 72 IN | BODY MASS INDEX: 39.09 KG/M2 | WEIGHT: 288.6 LBS | HEART RATE: 87 BPM

## 2024-02-19 DIAGNOSIS — I50.22 CHRONIC SYSTOLIC (CONGESTIVE) HEART FAILURE: Primary | ICD-10-CM

## 2024-02-19 DIAGNOSIS — I48.0 PAROXYSMAL ATRIAL FIBRILLATION: ICD-10-CM

## 2024-02-19 PROCEDURE — 99214 OFFICE O/P EST MOD 30 MIN: CPT | Performed by: INTERNAL MEDICINE

## 2024-02-19 PROCEDURE — 3074F SYST BP LT 130 MM HG: CPT | Performed by: INTERNAL MEDICINE

## 2024-02-19 PROCEDURE — 3079F DIAST BP 80-89 MM HG: CPT | Performed by: INTERNAL MEDICINE

## 2024-02-19 NOTE — PROGRESS NOTES
[facility] Provider Note     Date:   2024   Name: Gio Gutiérrez  :   1940  PCP: Javi Carlos MD    Chief Complaint   Patient presents with    Follow-up     Follow up after hospital visit   Patient has previously watched the CPR Video     Edema    Shortness of Breath       Subjective     History of Present Illness  Gio Gutiérrez is a 83 y.o. male who presents today for follow-up from 2 weeks ago.  In the interim he went to the hospital on 's Day with significant lower extremity edema.  We have gone up on his Lasix for a day or 2 at a time.  Every time he goes back down to his original dose he seems to get more edema.  He works as a  and is still very active.  He was not able to work on 's Day however because of his emergency room visit and shortness of breath. Mr. Gutiérrez says he is feeling better since the ER visit, and the edema is better, but not completely gone.  He said he is staying SOA. He stated that he has been trying to do some work with rests in between.  He states that his weight was 279 at the hospital after starting the Lasix, this morning it was 283.4 and it was 288.6 at this appointment.  He states he is trying to make some lifestyle changes to hopefully improve the edema.      Cardiology/sleep history  1. Atrial fib- new onset 2023, on eliquis, paroxsymal, in sinus rhythm on EKGs since  2. HFrEF -   -Echocardiogram 2023-LVEF is 49%.  Left ventricular diastolic function is consistent with (grade 1) impaired relaxation.  Mild mitral valve stenosis.  Mild dilation of the sinuses of Valsalva.  - C 8/15/23- No angiographically significant coronary artery disease LVEF 40%  Mild (2+) mitral regurgitation  3. Hyperlipidemia      Carotid duplex 2023- <50% stenosis bilaterally.    Declined HST/PSG- 2023    No Known Allergies    Current Outpatient Medications:     acetaminophen (TYLENOL) 650 MG 8 hr tablet, Take 1 tablet by mouth  Every 8 (Eight) Hours As Needed for Mild Pain., Disp: , Rfl:     albuterol sulfate  (90 Base) MCG/ACT inhaler, Inhale 2 puffs Every 6 (Six) Hours As Needed for Wheezing or Shortness of Air., Disp: 18 g, Rfl: 1    atorvastatin (LIPITOR) 40 MG tablet, TAKE 1 TABLET BY MOUTH EVERY DAY, Disp: 90 tablet, Rfl: 1    carvedilol (COREG) 3.125 MG tablet, Take 1 tablet by mouth 2 (Two) Times a Day., Disp: 60 tablet, Rfl: 3    citalopram (CeleXA) 20 MG tablet, Take 1 tablet by mouth Daily., Disp: , Rfl: 1    Eliquis 5 MG tablet tablet, Take 1 tablet by mouth Every 12 (Twelve) Hours., Disp: 180 tablet, Rfl: 1    finasteride (PROSCAR) 5 MG tablet, Take 1 tablet by mouth Daily., Disp: , Rfl: 2    furosemide (LASIX) 40 MG tablet, Take 1 tablet by mouth 2 (Two) Times a Day., Disp: 60 tablet, Rfl: 11    potassium chloride (K-DUR,KLOR-CON) 20 MEQ CR tablet, Take 1 tablet by mouth 2 (Two) Times a Day., Disp: 60 tablet, Rfl: 11    sacubitril-valsartan (Entresto) 24-26 MG tablet, Take 1 tablet by mouth 2 (Two) Times a Day., Disp: 60 tablet, Rfl: 3    tamsulosin (FLOMAX) 0.4 MG capsule 24 hr capsule, Take 2 capsules by mouth Daily., Disp: , Rfl: 1    temazepam (RESTORIL) 30 MG capsule, Take 1 capsule by mouth every night at bedtime., Disp: , Rfl:     Past Medical History:   Diagnosis Date    CAD (coronary artery disease)     Cardiomegaly     Chronic systolic (congestive) heart failure     COVID 09/2022    Diverticulosis     Dizziness and giddiness     Hypercholesterolemia     Occlusion and stenosis of bilateral carotid arteries     Prostate disorder       Past Surgical History:   Procedure Laterality Date    CARDIAC CATHETERIZATION      08/15/2023  PER DR. JOY    CARDIAC CATHETERIZATION Left 8/15/2023    Procedure: Cardiac Catheterization/Vascular Study;  Surgeon: Paresh Joy MD;  Location: [facility] INVASIVE LOCATION;  Service: Cardiovascular;  Laterality: Left;    CHOLECYSTECTOMY      HAND SURGERY      HERNIA REPAIR    "   KIDNEY STONE SURGERY       Family History   Problem Relation Age of Onset    Stroke Mother     Hypertension Mother     Heart attack Mother     Cancer Father         STOMACH    Diabetes Father     No Known Problems Sister      Social History     Socioeconomic History    Marital status:    Tobacco Use    Smoking status: Never     Passive exposure: Never    Smokeless tobacco: Never   Vaping Use    Vaping Use: Never used   Substance and Sexual Activity    Alcohol use: No    Drug use: No    Sexual activity: Defer       Objective     Vital Signs:  /82 (BP Location: Left arm, Patient Position: Sitting, Cuff Size: Adult)   Pulse 87   Ht 182.9 cm (72\")   Wt 131 kg (288 lb 9.6 oz)   SpO2 97%   BMI 39.14 kg/m²   Estimated body mass index is 39.14 kg/m² as calculated from the following:    Height as of this encounter: 182.9 cm (72\").    Weight as of this encounter: 131 kg (288 lb 9.6 oz).         Physical Exam  Constitutional:       Appearance: Normal appearance. He is well-developed. He is obese.   HENT:      Head: Normocephalic and atraumatic.      Nose: Nose normal.      Mouth/Throat:      Mouth: Mucous membranes are moist.   Eyes:      General: No scleral icterus.     Pupils: Pupils are equal, round, and reactive to light.   Neck:      Vascular: No carotid bruit.   Cardiovascular:      Rate and Rhythm: Normal rate and regular rhythm.      Pulses: Normal pulses.           Radial pulses are 2+ on the right side and 2+ on the left side.        Dorsalis pedis pulses are 2+ on the right side and 2+ on the left side.        Posterior tibial pulses are 2+ on the right side and 2+ on the left side.      Heart sounds: Normal heart sounds. No murmur heard.  Pulmonary:      Effort: Pulmonary effort is normal.      Breath sounds: Normal breath sounds. No wheezing or rhonchi.   Abdominal:      General: Bowel sounds are normal.   Musculoskeletal:      Cervical back: Neck supple.      Right lower leg: Edema (+2) " present.      Left lower leg: Edema (+2) present.   Skin:     General: Skin is warm and dry.      Capillary Refill: Capillary refill takes less than 2 seconds.      Coloration: Skin is not cyanotic.      Nails: There is no clubbing.   Neurological:      Mental Status: He is alert and oriented to person, place, and time.      Motor: No weakness.      Gait: Gait normal.   Psychiatric:         Mood and Affect: Mood normal.         Behavior: Behavior normal. Behavior is cooperative.         Thought Content: Thought content normal.         Cognition and Memory: Memory normal.                     Assessment and Plan     Diagnoses and all orders for this visit:    1. Chronic systolic (congestive) heart failure (Primary)  Comments:  Likely related to underlying bundle branch block.  Refer for CRT-P.  On medical therapy.  Difficulty diuresing.  Double up on Lasix/KCL again until procedure.  Orders:  -     furosemide (LASIX) 40 MG tablet; Take 1 tablet by mouth 2 (Two) Times a Day.  Dispense: 60 tablet; Refill: 11  -     potassium chloride (K-DUR,KLOR-CON) 20 MEQ CR tablet; Take 1 tablet by mouth 2 (Two) Times a Day.  Dispense: 60 tablet; Refill: 11    2. Paroxysmal atrial fibrillation  Comments:  Continue Eliquis and beta-blocker.  In sinus rhythm on EKGs since June.        Recommendations: Report if any new/changing symptoms immediately      Follow Up  Return in about 4 weeks (around 3/18/2024) for PM/ICD check. - referring for EP for CRTP in interim.     Aziza Soto MD   02/19/2024     Please note that this explicitly excludes time spent on other separate billable services such as performing procedures or test interpretation, when applicable.    This note was created using dictation software which occasionally transcribes nonsensical phrases. Please contact the provider if any clarification is needed.

## 2024-02-20 DIAGNOSIS — I48.0 PAROXYSMAL ATRIAL FIBRILLATION: ICD-10-CM

## 2024-02-20 DIAGNOSIS — I50.22 CHRONIC SYSTOLIC (CONGESTIVE) HEART FAILURE: Primary | ICD-10-CM

## 2024-02-20 DIAGNOSIS — I45.9 INTRA-VENTRICULAR CONDUCTION DELAY: ICD-10-CM

## 2024-02-20 RX ORDER — POTASSIUM CHLORIDE 20 MEQ/1
20 TABLET, EXTENDED RELEASE ORAL 2 TIMES DAILY
Qty: 60 TABLET | Refills: 11 | Status: SHIPPED | OUTPATIENT
Start: 2024-02-20

## 2024-02-20 RX ORDER — FUROSEMIDE 40 MG/1
40 TABLET ORAL 2 TIMES DAILY
Qty: 60 TABLET | Refills: 11 | Status: SHIPPED | OUTPATIENT
Start: 2024-02-20

## 2024-02-21 ENCOUNTER — PREP FOR SURGERY (OUTPATIENT)
Dept: OTHER | Facility: HOSPITAL | Age: 84
End: 2024-02-21
Payer: MEDICARE

## 2024-02-21 DIAGNOSIS — I50.22 CHRONIC SYSTOLIC (CONGESTIVE) HEART FAILURE: Primary | ICD-10-CM

## 2024-02-21 PROBLEM — I45.9 INTRA-VENTRICULAR CONDUCTION DELAY: Status: ACTIVE | Noted: 2024-02-20

## 2024-02-21 RX ORDER — ONDANSETRON 2 MG/ML
4 INJECTION INTRAMUSCULAR; INTRAVENOUS EVERY 6 HOURS PRN
Status: CANCELLED | OUTPATIENT
Start: 2024-02-21

## 2024-02-21 RX ORDER — SODIUM CHLORIDE 0.9 % (FLUSH) 0.9 %
3 SYRINGE (ML) INJECTION EVERY 12 HOURS SCHEDULED
Status: CANCELLED | OUTPATIENT
Start: 2024-02-21

## 2024-02-21 RX ORDER — SODIUM CHLORIDE 9 MG/ML
40 INJECTION, SOLUTION INTRAVENOUS AS NEEDED
Status: CANCELLED | OUTPATIENT
Start: 2024-02-21

## 2024-02-21 RX ORDER — NITROGLYCERIN 0.4 MG/1
0.4 TABLET SUBLINGUAL
Status: CANCELLED | OUTPATIENT
Start: 2024-02-21

## 2024-02-21 RX ORDER — CEFAZOLIN SODIUM 2 G/100ML
2 INJECTION, SOLUTION INTRAVENOUS ONCE
Status: CANCELLED | OUTPATIENT
Start: 2024-02-21 | End: 2024-02-21

## 2024-02-21 RX ORDER — SODIUM CHLORIDE 0.9 % (FLUSH) 0.9 %
10 SYRINGE (ML) INJECTION AS NEEDED
Status: CANCELLED | OUTPATIENT
Start: 2024-02-21

## 2024-02-21 RX ORDER — ACETAMINOPHEN 325 MG/1
650 TABLET ORAL EVERY 4 HOURS PRN
Status: CANCELLED | OUTPATIENT
Start: 2024-02-21

## 2024-02-26 ENCOUNTER — APPOINTMENT (OUTPATIENT)
Dept: GENERAL RADIOLOGY | Facility: HOSPITAL | Age: 84
End: 2024-02-26
Payer: MEDICARE

## 2024-02-26 ENCOUNTER — HOSPITAL ENCOUNTER (OUTPATIENT)
Facility: HOSPITAL | Age: 84
Setting detail: HOSPITAL OUTPATIENT SURGERY
Discharge: HOME | End: 2024-02-26
Attending: INTERNAL MEDICINE | Admitting: INTERNAL MEDICINE
Payer: MEDICARE

## 2024-02-26 VITALS
SYSTOLIC BLOOD PRESSURE: 126 MMHG | OXYGEN SATURATION: 97 % | RESPIRATION RATE: 13 BRPM | HEIGHT: 72 IN | HEART RATE: 93 BPM | DIASTOLIC BLOOD PRESSURE: 72 MMHG | WEIGHT: 277.78 LBS | TEMPERATURE: 97.4 F | BODY MASS INDEX: 37.62 KG/M2

## 2024-02-26 DIAGNOSIS — I48.0 PAROXYSMAL ATRIAL FIBRILLATION: ICD-10-CM

## 2024-02-26 DIAGNOSIS — I50.22 CHRONIC SYSTOLIC (CONGESTIVE) HEART FAILURE: ICD-10-CM

## 2024-02-26 DIAGNOSIS — I45.9 INTRA-VENTRICULAR CONDUCTION DELAY: ICD-10-CM

## 2024-02-26 PROCEDURE — 25010000002 ONDANSETRON PER 1 MG: Performed by: INTERNAL MEDICINE

## 2024-02-26 PROCEDURE — C1892 INTRO/SHEATH,FIXED,PEEL-AWAY: HCPCS | Performed by: INTERNAL MEDICINE

## 2024-02-26 PROCEDURE — 99152 MOD SED SAME PHYS/QHP 5/>YRS: CPT | Performed by: INTERNAL MEDICINE

## 2024-02-26 PROCEDURE — 25510000001 IOPAMIDOL PER 1 ML: Performed by: INTERNAL MEDICINE

## 2024-02-26 PROCEDURE — C1887 CATHETER, GUIDING: HCPCS | Performed by: INTERNAL MEDICINE

## 2024-02-26 PROCEDURE — 25010000002 MIDAZOLAM PER 1 MG: Performed by: INTERNAL MEDICINE

## 2024-02-26 PROCEDURE — C1898 LEAD, PMKR, OTHER THAN TRANS: HCPCS | Performed by: INTERNAL MEDICINE

## 2024-02-26 PROCEDURE — 25010000002 FENTANYL CITRATE (PF) 50 MCG/ML SOLUTION: Performed by: INTERNAL MEDICINE

## 2024-02-26 PROCEDURE — 71046 X-RAY EXAM CHEST 2 VIEWS: CPT

## 2024-02-26 PROCEDURE — C1900 LEAD, CORONARY VENOUS: HCPCS | Performed by: INTERNAL MEDICINE

## 2024-02-26 PROCEDURE — 25010000002 BUPIVACAINE 0.5 % SOLUTION: Performed by: INTERNAL MEDICINE

## 2024-02-26 PROCEDURE — C2621 PMKR, OTHER THAN SING/DUAL: HCPCS | Performed by: INTERNAL MEDICINE

## 2024-02-26 PROCEDURE — C1769 GUIDE WIRE: HCPCS | Performed by: INTERNAL MEDICINE

## 2024-02-26 PROCEDURE — 33225 L VENTRIC PACING LEAD ADD-ON: CPT | Performed by: INTERNAL MEDICINE

## 2024-02-26 PROCEDURE — 25810000003 SODIUM CHLORIDE 0.9 % SOLUTION: Performed by: INTERNAL MEDICINE

## 2024-02-26 PROCEDURE — 33208 INSRT HEART PM ATRIAL & VENT: CPT | Performed by: INTERNAL MEDICINE

## 2024-02-26 PROCEDURE — 93005 ELECTROCARDIOGRAM TRACING: CPT | Performed by: INTERNAL MEDICINE

## 2024-02-26 PROCEDURE — 25010000002 LIDOCAINE 1 % SOLUTION: Performed by: INTERNAL MEDICINE

## 2024-02-26 PROCEDURE — 99153 MOD SED SAME PHYS/QHP EA: CPT | Performed by: INTERNAL MEDICINE

## 2024-02-26 PROCEDURE — 94660 CPAP INITIATION&MGMT: CPT

## 2024-02-26 PROCEDURE — 25010000002 CEFAZOLIN PER 500 MG: Performed by: PHYSICIAN ASSISTANT

## 2024-02-26 PROCEDURE — 25010000002 HYDRALAZINE PER 20 MG: Performed by: INTERNAL MEDICINE

## 2024-02-26 PROCEDURE — S0260 H&P FOR SURGERY: HCPCS | Performed by: INTERNAL MEDICINE

## 2024-02-26 DEVICE — IMPLANTABLE DEVICE: Type: IMPLANTABLE DEVICE | Status: FUNCTIONAL

## 2024-02-26 DEVICE — LD QUARTET CRT VENT LNG SPACING 86CM: Type: IMPLANTABLE DEVICE | Status: FUNCTIONAL

## 2024-02-26 DEVICE — LD PM TENDRIL STS 6F52CM 2088TC52: Type: IMPLANTABLE DEVICE | Status: FUNCTIONAL

## 2024-02-26 DEVICE — LD PM TENDRIL STS 6F58CM 2088TC58: Type: IMPLANTABLE DEVICE | Status: FUNCTIONAL

## 2024-02-26 RX ORDER — HYDRALAZINE HYDROCHLORIDE 20 MG/ML
INJECTION INTRAMUSCULAR; INTRAVENOUS
Status: DISCONTINUED | OUTPATIENT
Start: 2024-02-26 | End: 2024-02-26 | Stop reason: HOSPADM

## 2024-02-26 RX ORDER — ACETAMINOPHEN 325 MG/1
650 TABLET ORAL ONCE
Status: COMPLETED | OUTPATIENT
Start: 2024-02-26 | End: 2024-02-26

## 2024-02-26 RX ORDER — ONDANSETRON 2 MG/ML
INJECTION INTRAMUSCULAR; INTRAVENOUS
Status: DISCONTINUED | OUTPATIENT
Start: 2024-02-26 | End: 2024-02-26 | Stop reason: HOSPADM

## 2024-02-26 RX ORDER — ONDANSETRON 2 MG/ML
4 INJECTION INTRAMUSCULAR; INTRAVENOUS EVERY 6 HOURS PRN
Status: DISCONTINUED | OUTPATIENT
Start: 2024-02-26 | End: 2024-02-26 | Stop reason: HOSPADM

## 2024-02-26 RX ORDER — SODIUM CHLORIDE 9 MG/ML
40 INJECTION, SOLUTION INTRAVENOUS AS NEEDED
Status: DISCONTINUED | OUTPATIENT
Start: 2024-02-26 | End: 2024-02-26 | Stop reason: HOSPADM

## 2024-02-26 RX ORDER — ACETAMINOPHEN 325 MG/1
650 TABLET ORAL EVERY 4 HOURS PRN
Status: DISCONTINUED | OUTPATIENT
Start: 2024-02-26 | End: 2024-02-26 | Stop reason: HOSPADM

## 2024-02-26 RX ORDER — ACETAMINOPHEN 325 MG/1
650 TABLET ORAL EVERY 6 HOURS PRN
COMMUNITY

## 2024-02-26 RX ORDER — SODIUM CHLORIDE 0.9 % (FLUSH) 0.9 %
3 SYRINGE (ML) INJECTION EVERY 12 HOURS SCHEDULED
Status: DISCONTINUED | OUTPATIENT
Start: 2024-02-26 | End: 2024-02-26 | Stop reason: HOSPADM

## 2024-02-26 RX ORDER — FENTANYL CITRATE 50 UG/ML
INJECTION, SOLUTION INTRAMUSCULAR; INTRAVENOUS
Status: DISCONTINUED | OUTPATIENT
Start: 2024-02-26 | End: 2024-02-26 | Stop reason: HOSPADM

## 2024-02-26 RX ORDER — SODIUM CHLORIDE 9 MG/ML
INJECTION, SOLUTION INTRAVENOUS
Status: COMPLETED | OUTPATIENT
Start: 2024-02-26 | End: 2024-02-26

## 2024-02-26 RX ORDER — SODIUM CHLORIDE 0.9 % (FLUSH) 0.9 %
10 SYRINGE (ML) INJECTION AS NEEDED
Status: DISCONTINUED | OUTPATIENT
Start: 2024-02-26 | End: 2024-02-26 | Stop reason: HOSPADM

## 2024-02-26 RX ORDER — NITROGLYCERIN 0.4 MG/1
0.4 TABLET SUBLINGUAL
Status: DISCONTINUED | OUTPATIENT
Start: 2024-02-26 | End: 2024-02-26 | Stop reason: HOSPADM

## 2024-02-26 RX ORDER — LIDOCAINE HYDROCHLORIDE 10 MG/ML
INJECTION, SOLUTION INFILTRATION; PERINEURAL
Status: DISCONTINUED | OUTPATIENT
Start: 2024-02-26 | End: 2024-02-26 | Stop reason: HOSPADM

## 2024-02-26 RX ORDER — BUPIVACAINE HYDROCHLORIDE 5 MG/ML
INJECTION, SOLUTION PERINEURAL
Status: DISCONTINUED | OUTPATIENT
Start: 2024-02-26 | End: 2024-02-26 | Stop reason: HOSPADM

## 2024-02-26 RX ORDER — MIDAZOLAM HYDROCHLORIDE 1 MG/ML
INJECTION INTRAMUSCULAR; INTRAVENOUS
Status: DISCONTINUED | OUTPATIENT
Start: 2024-02-26 | End: 2024-02-26 | Stop reason: HOSPADM

## 2024-02-26 RX ORDER — SODIUM CHLORIDE 0.9 % (FLUSH) 0.9 %
10 SYRINGE (ML) INJECTION EVERY 12 HOURS SCHEDULED
Status: DISCONTINUED | OUTPATIENT
Start: 2024-02-26 | End: 2024-02-26 | Stop reason: HOSPADM

## 2024-02-26 RX ADMIN — ACETAMINOPHEN 650 MG: 325 TABLET ORAL at 18:29

## 2024-02-26 RX ADMIN — SODIUM CHLORIDE 2 G: 900 INJECTION INTRAVENOUS at 13:44

## 2024-02-26 NOTE — H&P
Pre-cardiac Device Implantation History and Physical  [facility]      Patient:  Gio Gutiérrez  :  1940  MRN: [id number]    PCP:  Javi Carlos MD  PHONE:  [phone]    DATE: 2024  ID: Gio Gutiérrez is a 83 y.o. male from [facility]    CC: Systolic Heart Failure, LBBB    Cardiology/sleep history  1. Atrial fib- new onset 2023, on eliquis, paroxsymal, in sinus rhythm on EKGs since  2. HFrEF -   -Echocardiogram 2023-LVEF is 49%.  Left ventricular diastolic function is consistent with (grade 1) impaired relaxation.  Mild mitral valve stenosis.  Mild dilation of the sinuses of Valsalva.  - [facility] 8/15/23- No angiographically significant coronary artery disease LVEF 40%  Mild (2+) mitral regurgitation  3. Hyperlipidemia    BRIEF HPI: Mr. Gutiérrez is an 83-year-old male who presents for a consult and treat for chronic systolic heart failure/nonischemic cardiomyopathy with plans to undergo biventricular pacemaker.  He was diagnosed with atrial fibrillation last summer.  He presented to OSH on 's Day with significant lower extremity edema.  He was diuresed and Lasix was uptitrated and sent home in stable condition.  He still experiences some dyspnea on exertion.  He works as a  and reports being fairly active at his job. Since he was seen by Dr. Soto last on the  of this month, he reports continued shortness of breath and fatigue.  He denies any chest pain, palpitations, lightheadedness/dizziness or syncopal episode.        Allergies:      No Known Allergies    MEDICATIONS:  Current Outpatient Medications   Medication Instructions    acetaminophen (TYLENOL) 650 mg, Oral, Every 6 Hours PRN    albuterol sulfate  (90 Base) MCG/ACT inhaler 2 puffs, Inhalation, Every 6 Hours PRN    atorvastatin (LIPITOR) 40 mg, Oral, Daily    carvedilol (COREG) 3.125 mg, Oral, 2 Times Daily    citalopram (CELEXA) 20 mg, Oral, Daily    Eliquis 5 mg, Oral, Every 12  "Hours Scheduled    finasteride (PROSCAR) 5 mg, Oral, Daily    furosemide (LASIX) 40 mg, Oral, 2 Times Daily    potassium chloride (K-DUR,KLOR-CON) 20 MEQ CR tablet 20 mEq, Oral, 2 Times Daily    sacubitril-valsartan (Entresto) 24-26 MG tablet 1 tablet, Oral, 2 Times Daily    tamsulosin (FLOMAX) 0.4 MG capsule 24 hr capsule 2 capsules, Oral, Daily    temazepam (RESTORIL) 30 mg, Oral, Every Night at Bedtime       Past medical & surgical history, social and family history reviewed in the electronic medical record.    ROS: Pertinent positives listed in the HPI and problem list above. All others reviewed and negative.     Physical Exam:   BP (S) 102/66 (BP Location: Left arm, Patient Position: Lying)   Pulse 81   Temp 97.4 °F (36.3 °C) (Tympanic)   Resp 16   Ht 182.9 cm (72\")   Wt 126 kg (277 lb 12.5 oz)   SpO2 99%   BMI 37.67 kg/m²     Constitutional:    Well-appearing 83 y.o. y/o adult  in no acute distress        Heart:    Regular rhythm and normal rate, no murmurs, rubs or gallops   Lungs:     Clear to auscultation bilaterally, no wheezes, rhales or rhonchi, nonlabored respirations       Extremities:   No gross deformities, no edema, clubbing, or cyanosis.    Pulses:    Neuro:  Psych:   Radial and dorsalis pedis pulses palpable and equal bilaterally.  No gross focal deficits  Mood and behavior appropriate for situation       Labs and Diagnostic Data:  Lab Results   Component Value Date    GLUCOSE 146 (H) 08/15/2023    BUN 10 08/15/2023    CREATININE 0.90 08/15/2023    EGFR 84.2 08/15/2023    BCR 12.8 08/15/2023    K 4.4 08/15/2023    CO2 27.0 08/15/2023    CALCIUM 8.8 08/15/2023    ALBUMIN 3.7 08/15/2023    BILITOT 0.8 08/15/2023    AST 17 08/15/2023    ALT 18 08/15/2023     Lab Results   Component Value Date    WBC 5.80 08/15/2023    HGB 14.3 08/15/2023    HCT 42 08/15/2023    MCV 92.5 08/15/2023     08/15/2023     No results found for: \"TSH\"  Lab Results   Component Value Date    CHOL 98 08/15/2023    " TRIG 93 08/15/2023    HDL 54 08/15/2023    LDL 26 08/15/2023     Lab Results   Component Value Date    HGBA1C 6.70 (H) 08/15/2023           Tele: NSR    IMPRESSION:  Mr. Guillen is an 83-year-old male with history of nonischemic cardiomyopathy and left bundle branch block presents for permanent biventricular pacemaker/CRT-P implantation.  Last dose of anticoagulation on Friday    PLAN:  Procedure to perform: BiV PM implantation. Risks, benefits and alternatives to the procedure explained to the patient and he understands and wishes to proceed.     Scribed by Alex Block PA-C for Dr. Gio Pappas MD on 2/26/2024

## 2024-02-27 ENCOUNTER — CALL CENTER PROGRAMS (OUTPATIENT)
Dept: CALL CENTER | Facility: HOSPITAL | Age: 84
End: 2024-02-27
Payer: MEDICARE

## 2024-02-27 LAB
QT INTERVAL: 372 MS
QTC INTERVAL: 474 MS

## 2024-02-27 NOTE — OUTREACH NOTE
PCI/Device Survey      Flowsheet Row Responses   Facility patient discharged from? [facility]   Procedure date 02/26/24   Procedure (if device, specify in description) Device   Device Description GEN PM QUADRA ALLURE MP CRTP [id number] - [id number] - [id number]   Performing MD Other (annotate)  [Dr. JETT]   Attempt successful? Yes   Call start time 1115   Call end time 1123   Has the patient had any of the following symptoms since discharge? --  [no issues]   Is the patient taking prescribed medications: --  [ELIQUIS]   Nursing intervention Reminded to continue to take prescribed medications   Does the patient have any of the following symptoms related to the cath/surgical site? --  [LEFT CHEST SITE- dressing intact denies issues]   Does the patient have an appointment scheduled with the cardiologist? Yes   Appointment comments Cardio appt 3/6/24 10 am   If the patient is a current smoker, are they able to teach back resources for cessation? Not a smoker   Did the patient feel prepared to go home on the same day as the procedure? Yes   Is the patient satisfied with the same day discharge process? Yes   Discharge process comments very happy with care   PCI/Device call completed Yes   Wrap up additional comments Wife reports pt  is doing great . No issues            TRIPP CORTES - Registered Nurse

## 2024-03-06 ENCOUNTER — TELEPHONE (OUTPATIENT)
Dept: CARDIOLOGY | Facility: CLINIC | Age: 84
End: 2024-03-06

## 2024-03-06 ENCOUNTER — OFFICE VISIT (OUTPATIENT)
Dept: CARDIOLOGY | Facility: CLINIC | Age: 84
End: 2024-03-06
Payer: MEDICARE

## 2024-03-06 VITALS
HEART RATE: 83 BPM | DIASTOLIC BLOOD PRESSURE: 70 MMHG | HEIGHT: 72 IN | OXYGEN SATURATION: 98 % | WEIGHT: 280 LBS | SYSTOLIC BLOOD PRESSURE: 114 MMHG | BODY MASS INDEX: 37.93 KG/M2

## 2024-03-06 DIAGNOSIS — R60.0 BILATERAL LEG EDEMA: ICD-10-CM

## 2024-03-06 DIAGNOSIS — Z95.0 PRESENCE OF BIVENTRICULAR CARDIAC PACEMAKER: ICD-10-CM

## 2024-03-06 DIAGNOSIS — J98.8 RESPIRATORY INFECTION: ICD-10-CM

## 2024-03-06 RX ORDER — AZITHROMYCIN 250 MG/1
TABLET, FILM COATED ORAL
Qty: 6 TABLET | Refills: 0 | Status: SHIPPED | OUTPATIENT
Start: 2024-03-06

## 2024-03-06 NOTE — TELEPHONE ENCOUNTER
Caller: ANDREAS ROWLEY    Relationship to patient: Emergency Contact    Best call back number: [phone]    Patient is needing: WOULD LIKE TO LET KELBY KNOW TO SEND HIS ANTIBIOTIC TO THE [facility] IN [place].

## 2024-03-07 PROBLEM — Z95.0 PRESENCE OF BIVENTRICULAR CARDIAC PACEMAKER: Status: ACTIVE | Noted: 2024-03-07

## 2024-03-07 PROBLEM — R60.0 BILATERAL LEG EDEMA: Status: ACTIVE | Noted: 2024-03-07

## 2024-03-07 NOTE — PROGRESS NOTES
[facility] Provider Note     Date:   2024   Name: Gio Gutiérrez  :   1940  PCP: Javi Carlos MD    Chief Complaint   Patient presents with    Atrial Fibrillation     1 week wound check.        Subjective     History of Present Illness  Gio Gutiérrez is a 83 y.o. male who presents today for 1 week wound check on pacemaker placement.  He had a [employer] BiV-PPM model 3562 with mode DDDR and rate 60 placed on 2024.  Pacemaker interrogated in clinic today.  Showing good battery and leads in place.  No significant events seen.  Reviewed with Dr. Soto.    He denies any chest pain or shortness of air.  He has 2023 lipids look great.  Spoke with Dr. Soto and for now we will continue his increased Lasix of twice daily with potassium twice daily and recheck labs in 4 to 6 minutes,    He is accompanied with his wife today and she said she was just started on an antibiotic a couple days ago and she thinks she is given her respiratory infection to her  and is requesting an antibiotic for him.  He denies any allergies to medications.  Will send in Z-Derrick.    Pacemaker incision well-healed.  Left open to air.    Cardiology/sleep history  1. Atrial fib- new onset 2023, on eliquis, paroxsymal, in sinus rhythm on EKGs since  2. HFrEF -   -Echocardiogram 2023-LVEF is 49%.  Left ventricular diastolic function is consistent with (grade 1) impaired relaxation.  Mild mitral valve stenosis.  Mild dilation of the sinuses of Valsalva.  - C 8/15/23- No angiographically significant coronary artery disease LVEF 40%  Mild (2+) mitral regurgitation  3. Hyperlipidemia      Carotid duplex 2023- <50% stenosis bilaterally.    Declined HST/PSG- 2023    No Known Allergies    Current Outpatient Medications:     acetaminophen (TYLENOL) 325 MG tablet, Take 2 tablets by mouth Every 6 (Six) Hours As Needed for Mild Pain., Disp: , Rfl:     albuterol sulfate  (90  Base) MCG/ACT inhaler, Inhale 2 puffs Every 6 (Six) Hours As Needed for Wheezing or Shortness of Air., Disp: 18 g, Rfl: 1    atorvastatin (LIPITOR) 40 MG tablet, TAKE 1 TABLET BY MOUTH EVERY DAY, Disp: 90 tablet, Rfl: 1    carvedilol (COREG) 3.125 MG tablet, Take 1 tablet by mouth 2 (Two) Times a Day., Disp: 60 tablet, Rfl: 3    citalopram (CeleXA) 20 MG tablet, Take 1 tablet by mouth Daily., Disp: , Rfl: 1    Eliquis 5 MG tablet tablet, Take 1 tablet by mouth Every 12 (Twelve) Hours., Disp: 180 tablet, Rfl: 1    finasteride (PROSCAR) 5 MG tablet, Take 1 tablet by mouth Daily., Disp: , Rfl: 2    furosemide (LASIX) 40 MG tablet, Take 1 tablet by mouth 2 (Two) Times a Day., Disp: 60 tablet, Rfl: 11    potassium chloride (K-DUR,KLOR-CON) 20 MEQ CR tablet, Take 1 tablet by mouth 2 (Two) Times a Day., Disp: 60 tablet, Rfl: 11    sacubitril-valsartan (Entresto) 24-26 MG tablet, Take 1 tablet by mouth 2 (Two) Times a Day., Disp: 60 tablet, Rfl: 3    tamsulosin (FLOMAX) 0.4 MG capsule 24 hr capsule, Take 2 capsules by mouth Daily., Disp: , Rfl: 1    temazepam (RESTORIL) 30 MG capsule, Take 1 capsule by mouth every night at bedtime., Disp: , Rfl:     azithromycin (Zithromax Z-Derrick) 250 MG tablet, Take 2 tablets by mouth on day 1, then 1 tablet daily on days 2-5, Disp: 6 tablet, Rfl: 0    Past Medical History:   Diagnosis Date    CAD (coronary artery disease)     Cardiomegaly     Chronic systolic (congestive) heart failure     COVID 09/2022    Diverticulosis     Dizziness and giddiness     Hypercholesterolemia     Occlusion and stenosis of bilateral carotid arteries     Prostate disorder       Past Surgical History:   Procedure Laterality Date    CARDIAC CATHETERIZATION      08/15/2023  PER DR. JOY    CARDIAC CATHETERIZATION Left 8/15/2023    Procedure: Cardiac Catheterization/Vascular Study;  Surgeon: Paresh Joy MD;  Location: [facility] INVASIVE LOCATION;  Service: Cardiovascular;  Laterality: Left;     "CHOLECYSTECTOMY      HAND SURGERY      HERNIA REPAIR      KIDNEY STONE SURGERY      PACEMAKER IMPLANTATION N/A 2/26/2024    Procedure: Biv PPM Implant (SJM), hold Eliquis 2 days, C&T Dr. Soto;  Surgeon: Gio Pappas MD;  Location: [facility]VAS[facility] LOC[facility];  Service: Cardiovascular;  Laterality: N/A;     Family History   Problem Relation Age of Onset    Stroke Mother     Hypertension Mother     Heart attack Mother     Cancer Father         STOMACH    Diabetes Father     No Known Problems Sister      Social History     Socioeconomic History    Marital status:    Tobacco Use    Smoking status: Never     Passive exposure: Never    Smokeless tobacco: Never   Vaping Use    Vaping status: Never Used   Substance and Sexual Activity    Alcohol use: No    Drug use: No    Sexual activity: Defer       Objective     Vital Signs:  /70   Pulse 83   Ht 182.9 cm (72\")   Wt 127 kg (280 lb)   SpO2 98%   BMI 37.97 kg/m²   Estimated body mass index is 37.97 kg/m² as calculated from the following:    Height as of this encounter: 182.9 cm (72\").    Weight as of this encounter: 127 kg (280 lb).         Physical Exam  Vitals reviewed.   Constitutional:       Appearance: Normal appearance.   Eyes:      Pupils: Pupils are equal, round, and reactive to light.   Neck:      Vascular: No carotid bruit.   Cardiovascular:      Rate and Rhythm: Normal rate and regular rhythm.      Pulses: Normal pulses.      Heart sounds: Normal heart sounds.   Pulmonary:      Effort: Pulmonary effort is normal.      Breath sounds: Normal breath sounds.   Musculoskeletal:         General: Normal range of motion.      Right lower leg: No edema.      Left lower leg: No edema.   Skin:     General: Skin is warm and dry.      Findings: Lesion present.      Comments: Pacemaker incision on left side of chest well-approximated and well-healed.  Removed bandage and left open to air.  No signs or symptoms of infection, no redness, no drainage. "   Neurological:      Mental Status: He is alert and oriented to person, place, and time.      Gait: Gait is intact.   Psychiatric:         Attention and Perception: Attention normal.                     Assessment and Plan     Diagnoses and all orders for this visit:    1. Presence of biventricular cardiac pacemaker [Z95.0]  Comments:  1 week postop wound check.  Well-healed.  Good battery and leads in place.    2. Respiratory infection  Comments:  Rx Z-Derrick  Orders:  -     azithromycin (Zithromax Z-Derrick) 250 MG tablet; Take 2 tablets by mouth on day 1, then 1 tablet daily on days 2-5  Dispense: 6 tablet; Refill: 0    3. Bilateral leg edema  Comments:  Doing well with Lasix and potassium twice daily so we will plan to continue and recheck labs in 4 to 6 weeks.  Orders:  -     Basic Metabolic Panel; Future        Recommendations: ER if symptoms increase and Report if any new/changing symptoms immediately      Follow Up  Return in about 3 months (around 6/6/2024) for with pacemaker check .    Maranda Lepe, APRN   03/06/2024     Please note that this explicitly excludes time spent on other separate billable services such as performing procedures or test interpretation, when applicable.    This note was created using dictation software which occasionally transcribes nonsensical phrases. Please contact the provider if any clarification is needed.

## 2024-04-09 RX ORDER — CARVEDILOL 3.12 MG/1
3.12 TABLET ORAL 2 TIMES DAILY
Qty: 60 TABLET | Refills: 3 | Status: SHIPPED | OUTPATIENT
Start: 2024-04-09

## 2024-05-06 ENCOUNTER — TELEPHONE (OUTPATIENT)
Dept: CARDIOLOGY | Facility: CLINIC | Age: 84
End: 2024-05-06

## 2024-05-06 NOTE — TELEPHONE ENCOUNTER
Caller: ANDREAS ROWLEY    Relationship: Emergency Contact    Best call back number: [phone]     What is the best time to reach you: ANYTIME, LEAVE VM     What was the call regarding: REQUESTING TO SPEAK WITH THE LADY IN OFFICE THAT WORKS ON INSURANCE/ HAS AN OFFICE RIGHT DOWN FROM [employer] (UNKNOWN NAME)    STATES THIS IS [employer]     Is it okay if the provider responds through MyChart: CALL

## 2024-05-08 ENCOUNTER — TELEPHONE (OUTPATIENT)
Dept: CARDIOLOGY | Facility: CLINIC | Age: 84
End: 2024-05-08
Payer: MEDICARE

## 2024-05-16 ENCOUNTER — TELEPHONE (OUTPATIENT)
Dept: CARDIOLOGY | Facility: CLINIC | Age: 84
End: 2024-05-16

## 2024-05-16 NOTE — TELEPHONE ENCOUNTER
Caller: ANDREAS ROWLEY    Relationship: SPOUSE    Best call back number: [phone]    What is your medical concern? HEADACHES    How long has this issue been going on? A MONTH    Is your provider already aware of this issue? NO    Have you been treated for this issue? TREATED AT ER .    PATIENTS WIFE ANDREAS ROWLEY, CALLED AND WANTS TO KNOW IF PATIENT NEEDS TO BE SEEN SOONER THAN HIS JUNE 5TH APPOINTMENT. PATIENTS PCP SENT HIM TO THE ER.  PATIENT DID HAVE CAT SCAN AT ER . NOTHING WAS FOUND ON THE CAT SCAN, PATIENT WAS GIVEN MUSCLE RELAXER TO TAKE AT  HOME ALONG WITH 3 SHOTS AT THE ER. PLEASE REACH PATIENTS WIFE TO DISCUSS.  PLEASE CALL.

## 2024-05-20 ENCOUNTER — OFFICE VISIT (OUTPATIENT)
Dept: CARDIOLOGY | Facility: CLINIC | Age: 84
End: 2024-05-20
Payer: MEDICARE

## 2024-05-20 VITALS
BODY MASS INDEX: 38.67 KG/M2 | HEART RATE: 75 BPM | SYSTOLIC BLOOD PRESSURE: 118 MMHG | OXYGEN SATURATION: 96 % | HEIGHT: 72 IN | WEIGHT: 285.5 LBS | DIASTOLIC BLOOD PRESSURE: 72 MMHG

## 2024-05-20 DIAGNOSIS — G44.221 CHRONIC TENSION-TYPE HEADACHE, INTRACTABLE: Primary | ICD-10-CM

## 2024-05-20 DIAGNOSIS — R60.0 BILATERAL LEG EDEMA: ICD-10-CM

## 2024-05-20 DIAGNOSIS — Z95.0 PRESENCE OF BIVENTRICULAR CARDIAC PACEMAKER: ICD-10-CM

## 2024-05-20 RX ORDER — METHOCARBAMOL 500 MG/1
2 TABLET, FILM COATED ORAL EVERY 6 HOURS
COMMUNITY
Start: 2024-05-08 | End: 2024-05-20 | Stop reason: SDUPTHER

## 2024-05-20 RX ORDER — TRAMADOL HYDROCHLORIDE 50 MG/1
50 TABLET ORAL EVERY 12 HOURS PRN
Qty: 30 TABLET | Refills: 0 | Status: SHIPPED | OUTPATIENT
Start: 2024-05-20

## 2024-05-20 RX ORDER — METHOCARBAMOL 500 MG/1
500 TABLET, FILM COATED ORAL EVERY 6 HOURS PRN
Qty: 60 TABLET | Refills: 0 | Status: SHIPPED | OUTPATIENT
Start: 2024-05-20

## 2024-05-20 RX ORDER — TRAMADOL HYDROCHLORIDE 50 MG/1
1 TABLET ORAL 3 TIMES DAILY
COMMUNITY
Start: 2024-05-08 | End: 2024-05-20 | Stop reason: SDUPTHER

## 2024-05-20 NOTE — ASSESSMENT & PLAN NOTE
Headaches are improving with treatment.  Plan:  Continue same medication/s without change.     Discussed medication dosage, use, side effects, and goals of treatment in detail.    Discussed monitoring symptoms and use of quick-relief medications and maintenance medication.    -Patient has noticed an improvement since taking tramadol and Robaxin prescribed in the ER.  He is currently out of medication and requesting refill.    - 1 month supply given, he will need to follow-up with PCP for further refills.  General Treatment Goals:   symptom prevention  prevention of exacerbations    Followup at the next regular appointment

## 2024-05-20 NOTE — PROGRESS NOTES
[facility] Provider Note     Date:   2024   Name: Gio Gutiérrez  :   1940  PCP: Javi Carlos MD    Chief Complaint   Patient presents with    Follow-up     Patient here for follow up of headache he has had for 6 weeks, he states today he is feeling a lot better than he has been. Was seen at ER on 24 given medications there and prescriptions for pain medications and muscle relaxer.       Subjective     History of Present Illness  Gio Gutiérrez is a 84 y.o. male who presents today for follow-up on recent ER visit. He is s/p [employer] BiV-PPM model 3562 with mode DDDR and rate 60 placed on 2024.  He reports that for the last 6 weeks he has had a severe headache/migraine.  He was evaluated in the ER at [facility] on 2024 for severe headache.  CT of head was normal.  He was discharged home with Robaxin and tramadol.  He reports that today he is feeling much better, is the first day in the last 6 weeks that he has felt good.  He is almost out of medication and is requesting refills.    He denies any current cardiac symptoms.  He denies chest pain, shortness of breath, palpitations, lower extremity edema or syncope.  Overall, he is stable from a cardiac standpoint.  He is scheduled for device interrogation next month.  He is will be leaving for vacation shortly after that and would like to keep his appointment as scheduled.    Cardiology/sleep history  1. Atrial fib- new onset 2023, on eliquis, paroxsymal, in sinus rhythm on EKGs since  2. HFrEF -   -Echocardiogram 2023-LVEF is 49%.  Left ventricular diastolic function is consistent with (grade 1) impaired relaxation.  Mild mitral valve stenosis.  Mild dilation of the sinuses of Valsalva.  - [facility] 8/15/23- No angiographically significant coronary artery disease LVEF 40%  Mild (2+) mitral regurgitation  3. Hyperlipidemia    2024 Labs- Crea 0.91, EGFR 83, K 4.1, Ha1c 7.1,     Carotid  duplex 1/4/2023- <50% stenosis bilaterally.    Declined HST/PSG- 6/2023     Reports Denies   Chest Pain [] [x]   Shortness of Air [] [x]   Palpitations [] [x]   Edema [] [x]   Dizziness [] [x]   Syncope [] [x]       No Known Allergies    Current Outpatient Medications:     acetaminophen (TYLENOL) 325 MG tablet, Take 2 tablets by mouth Every 6 (Six) Hours As Needed for Mild Pain., Disp: , Rfl:     albuterol sulfate  (90 Base) MCG/ACT inhaler, Inhale 2 puffs Every 6 (Six) Hours As Needed for Wheezing or Shortness of Air., Disp: 18 g, Rfl: 1    atorvastatin (LIPITOR) 40 MG tablet, TAKE 1 TABLET BY MOUTH EVERY DAY, Disp: 90 tablet, Rfl: 1    carvedilol (COREG) 3.125 MG tablet, TAKE 1 TABLET BY MOUTH TWICE A DAY, Disp: 60 tablet, Rfl: 3    citalopram (CeleXA) 20 MG tablet, Take 1 tablet by mouth Daily., Disp: , Rfl: 1    Eliquis 5 MG tablet tablet, Take 1 tablet by mouth Every 12 (Twelve) Hours., Disp: 180 tablet, Rfl: 1    finasteride (PROSCAR) 5 MG tablet, Take 1 tablet by mouth Daily., Disp: , Rfl: 2    furosemide (LASIX) 40 MG tablet, Take 1 tablet by mouth 2 (Two) Times a Day., Disp: 60 tablet, Rfl: 11    methocarbamol (ROBAXIN) 500 MG tablet, Take 1 tablet by mouth Every 6 (Six) Hours As Needed for Muscle Spasms., Disp: 60 tablet, Rfl: 0    potassium chloride (K-DUR,KLOR-CON) 20 MEQ CR tablet, Take 1 tablet by mouth 2 (Two) Times a Day., Disp: 60 tablet, Rfl: 11    sacubitril-valsartan (Entresto) 24-26 MG tablet, Take 1 tablet by mouth 2 (Two) Times a Day., Disp: 60 tablet, Rfl: 3    tamsulosin (FLOMAX) 0.4 MG capsule 24 hr capsule, Take 2 capsules by mouth Daily., Disp: , Rfl: 1    temazepam (RESTORIL) 30 MG capsule, Take 1 capsule by mouth every night at bedtime., Disp: , Rfl:     traMADol (ULTRAM) 50 MG tablet, Take 1 tablet by mouth Every 12 (Twelve) Hours As Needed for Moderate Pain., Disp: 30 tablet, Rfl: 0    Past Medical History:   Diagnosis Date    CAD (coronary artery disease)     Cardiomegaly      "Chronic systolic (congestive) heart failure     COVID 09/2022    Diverticulosis     Dizziness and giddiness     Hypercholesterolemia     Occlusion and stenosis of bilateral carotid arteries     Prostate disorder       Past Surgical History:   Procedure Laterality Date    CARDIAC CATHETERIZATION      08/15/2023  PER DR. JOY    CARDIAC CATHETERIZATION Left 8/15/2023    Procedure: Cardiac Catheterization/Vascular Study;  Surgeon: Paresh Joy MD;  Location:  [facility];  Service: Cardiovascular;  Laterality: Left;    CHOLECYSTECTOMY      HAND SURGERY      HERNIA REPAIR      KIDNEY STONE SURGERY      PACEMAKER IMPLANTATION N/A 2/26/2024    Procedure: Biv PPM Implant (SJ), hold Eliquis 2 days, C&T Dr. Soto;  Surgeon: Gio Pappas MD;  Location:  [facility] INV[facility];  Service: Cardiovascular;  Laterality: N/A;     Family History   Problem Relation Age of Onset    Stroke Mother     Hypertension Mother     Heart attack Mother     Cancer Father         STOMACH    Diabetes Father     No Known Problems Sister      Social History     Socioeconomic History    Marital status:    Tobacco Use    Smoking status: Never     Passive exposure: Never    Smokeless tobacco: Never   Vaping Use    Vaping status: Never Used   Substance and Sexual Activity    Alcohol use: No    Drug use: No    Sexual activity: Defer       Objective     Vital Signs:  /72 (BP Location: Right arm, Patient Position: Sitting, Cuff Size: Adult)   Pulse 75   Ht 182.9 cm (72\")   Wt 130 kg (285 lb 8 oz)   SpO2 96%   BMI 38.72 kg/m²   Estimated body mass index is 38.72 kg/m² as calculated from the following:    Height as of this encounter: 182.9 cm (72\").    Weight as of this encounter: 130 kg (285 lb 8 oz).         Physical Exam  Vitals reviewed.   Constitutional:       Appearance: Normal appearance.   HENT:      Head: Normocephalic.   Cardiovascular:      Rate and Rhythm: Normal rate and regular rhythm.      Heart " sounds: Normal heart sounds.   Pulmonary:      Effort: Pulmonary effort is normal.      Breath sounds: Normal breath sounds.   Musculoskeletal:      Right lower leg: No edema.      Left lower leg: No edema.   Skin:     General: Skin is warm and dry.      Capillary Refill: Capillary refill takes less than 2 seconds.   Neurological:      General: No focal deficit present.      Mental Status: He is alert and oriented to person, place, and time.   Psychiatric:         Mood and Affect: Mood normal.         Behavior: Behavior normal.           Recent hospitalization notes reviewed: Recent ER notes reviewed          Assessment and Plan     Diagnoses and all orders for this visit:    1. Chronic tension-type headache, intractable (Primary)  Assessment & Plan:  Headaches are improving with treatment.  Plan:  Continue same medication/s without change.     Discussed medication dosage, use, side effects, and goals of treatment in detail.    Discussed monitoring symptoms and use of quick-relief medications and maintenance medication.    -Patient has noticed an improvement since taking tramadol and Robaxin prescribed in the ER.  He is currently out of medication and requesting refill.    - 1 month supply given, he will need to follow-up with PCP for further refills.  General Treatment Goals:   symptom prevention  prevention of exacerbations    Followup at the next regular appointment    Orders:  -     methocarbamol (ROBAXIN) 500 MG tablet; Take 1 tablet by mouth Every 6 (Six) Hours As Needed for Muscle Spasms.  Dispense: 60 tablet; Refill: 0  -     traMADol (ULTRAM) 50 MG tablet; Take 1 tablet by mouth Every 12 (Twelve) Hours As Needed for Moderate Pain.  Dispense: 30 tablet; Refill: 0    2. Presence of biventricular cardiac pacemaker [Z95.0]  Assessment & Plan:  He is scheduled for device interrogation next month.  He would like to keep that appointment.  - Device interrogation and follow-up in 1 month      3. Bilateral leg  edema  Assessment & Plan:  Currently stable, no lower extremity edema on exam.  Recent BMP reviewed with normal kidney function and electrolytes.  -Continue Lasix and potassium at current doses.          Recommendations: ER if symptoms increase and Report if any new/changing symptoms immediately          Follow Up  Return for keep appointment in June for pacemaker check. .  Patient was given instructions and counseling regarding his condition or for health maintenance advice. Please see specific information pulled into the AVS if appropriate.

## 2024-05-20 NOTE — ASSESSMENT & PLAN NOTE
Currently stable, no lower extremity edema on exam.  Recent BMP reviewed with normal kidney function and electrolytes.  -Continue Lasix and potassium at current doses.

## 2024-05-20 NOTE — ASSESSMENT & PLAN NOTE
He is scheduled for device interrogation next month.  He would like to keep that appointment.  - Device interrogation and follow-up in 1 month

## 2024-05-21 ENCOUNTER — TELEPHONE (OUTPATIENT)
Dept: CARDIOLOGY | Facility: CLINIC | Age: 84
End: 2024-05-21
Payer: MEDICARE

## 2024-05-21 NOTE — TELEPHONE ENCOUNTER
CALLED TO NOTIFY PATIENT OF ELIQUIS FINANCIAL ASSISTANCE BEING APPROVED. SPOKE TO WIFE, ANDREAS. ADVISED THAT THE MEDICATION WOULD BE MAILED TO THEIR HOME AND A NEW APPLICATION WILL NEED TO BE SUBMITTED AFTER 12/31/24. ALSO ADVISED TO COME BY THE OFFICE IF SAMPLES ARE NEEDED BEFORE HIS FIRST SHIPMENT IS DELIVERED.

## 2024-05-30 ENCOUNTER — TELEPHONE (OUTPATIENT)
Dept: CARDIOLOGY | Facility: CLINIC | Age: 84
End: 2024-05-30

## 2024-05-30 NOTE — TELEPHONE ENCOUNTER
Caller: ANDREAS ROWLEY    Relationship: Emergency Contact    Best call back number: [phone]    What is the best time to reach you: ANY    Who are you requesting to speak with (clinical staff, provider,  specific staff member): CLAUDINE    What was the call regarding: WIFE ASKING FOR THE PHONE NUMBER TO CALL THE COMPANY THAT IS HELPING WITH FINANCIAL HELP FOR THE PATIENT'S MEDICATION. SHE SAID THEY LVM BUT IT'S SO FAST SHE CAN'T UNDERSTAND AND IS UNABLE TO PULL IT UP AGAIN. PLEASE CALL THE ABOVE TO DISCUSS.     Is it okay if the provider responds through MyChart: NO

## 2024-06-05 ENCOUNTER — OFFICE VISIT (OUTPATIENT)
Dept: CARDIOLOGY | Facility: CLINIC | Age: 84
End: 2024-06-05
Payer: MEDICARE

## 2024-06-05 VITALS
SYSTOLIC BLOOD PRESSURE: 118 MMHG | DIASTOLIC BLOOD PRESSURE: 76 MMHG | HEART RATE: 79 BPM | OXYGEN SATURATION: 98 % | WEIGHT: 283 LBS | BODY MASS INDEX: 38.33 KG/M2 | HEIGHT: 72 IN

## 2024-06-05 DIAGNOSIS — Z95.0 PRESENCE OF BIVENTRICULAR CARDIAC PACEMAKER: ICD-10-CM

## 2024-06-05 DIAGNOSIS — I48.0 PAROXYSMAL ATRIAL FIBRILLATION: ICD-10-CM

## 2024-06-05 NOTE — PROGRESS NOTES
[facility] Provider Note     Date:   2024   Name: Gio Gutiérrez  :   1940  PCP: Javi Carlos MD    Chief Complaint   Patient presents with    Pacemaker Check     3 month    Atrial Fibrillation       Subjective     History of Present Illness  Gio Gutiérrez is a 84 y.o. male who presents today for routine device check.  He is accompanied by his wife today.He is s/p [employer]il BiV-PPM model 3562 with mode DDDR and rate 60 placed on 2024.  His wife reports that he has done so much better since having his Biventricular pacemaker placed 2024 for chronic systolic heart failure/nonischemic cardiomyopathy by Dr. Pappas.    Patient does have chronic SOA but feels it is greatly improved with BIV PPM.  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  He says he can do about 10 to 15 minutes of exertional work and then he has to sit down in front of the fan.    He has bilateral lower extremity edema has significantly improved.  He denies any chest pain, palpitations, dizziness, or syncope.    Pacemaker interrogated and downloaded in clinic today showing good battery life at 8.1 years.  Leads are in place.  No episodes.  Reviewed with Dr. Soto.    We will see patient back in 6 months with repeat pacemaker check.  Sooner if needed.    Cardiology/sleep history  1. Atrial fib- new onset 2023, on eliquis, paroxsymal, in sinus rhythm on EKGs since  2. HFrEF -   -Echocardiogram 2023-LVEF is 49%.  Left ventricular diastolic function is consistent with (grade 1) impaired relaxation.  Mild mitral valve stenosis.  Mild dilation of the sinuses of Valsalva.  - [facility] [place] 8/15/23- No angiographically significant coronary artery disease LVEF 40%  Mild (2+) mitral regurgitation  3. Hyperlipidemia  4.  Hypertension  5.Biventricular pacemaker placed 2024 for chronic systolic heart failure/nonischemic cardiomyopathy by Dr. Pappas.    2024 Labs- Crea 0.91, EGFR 83, K  4.1, Ha1c 7.1,     Carotid duplex 1/4/2023- <50% stenosis bilaterally.    Declined HST/PSG- 6/2023    No Known Allergies    Current Outpatient Medications:     acetaminophen (TYLENOL) 325 MG tablet, Take 2 tablets by mouth Every 6 (Six) Hours As Needed for Mild Pain., Disp: , Rfl:     albuterol sulfate  (90 Base) MCG/ACT inhaler, Inhale 2 puffs Every 6 (Six) Hours As Needed for Wheezing or Shortness of Air., Disp: 18 g, Rfl: 1    atorvastatin (LIPITOR) 40 MG tablet, TAKE 1 TABLET BY MOUTH EVERY DAY, Disp: 90 tablet, Rfl: 1    carvedilol (COREG) 3.125 MG tablet, TAKE 1 TABLET BY MOUTH TWICE A DAY, Disp: 60 tablet, Rfl: 3    citalopram (CeleXA) 20 MG tablet, Take 1 tablet by mouth Daily., Disp: , Rfl: 1    Eliquis 5 MG tablet tablet, Take 1 tablet by mouth Every 12 (Twelve) Hours., Disp: 180 tablet, Rfl: 1    finasteride (PROSCAR) 5 MG tablet, Take 1 tablet by mouth Daily., Disp: , Rfl: 2    furosemide (LASIX) 40 MG tablet, Take 1 tablet by mouth 2 (Two) Times a Day., Disp: 60 tablet, Rfl: 11    methocarbamol (ROBAXIN) 500 MG tablet, Take 1 tablet by mouth Every 6 (Six) Hours As Needed for Muscle Spasms., Disp: 60 tablet, Rfl: 0    potassium chloride (K-DUR,KLOR-CON) 20 MEQ CR tablet, Take 1 tablet by mouth 2 (Two) Times a Day., Disp: 60 tablet, Rfl: 11    sacubitril-valsartan (Entresto) 24-26 MG tablet, Take 1 tablet by mouth 2 (Two) Times a Day., Disp: 60 tablet, Rfl: 3    tamsulosin (FLOMAX) 0.4 MG capsule 24 hr capsule, Take 2 capsules by mouth Daily., Disp: , Rfl: 1    temazepam (RESTORIL) 30 MG capsule, Take 1 capsule by mouth every night at bedtime., Disp: , Rfl:     traMADol (ULTRAM) 50 MG tablet, Take 1 tablet by mouth Every 12 (Twelve) Hours As Needed for Moderate Pain., Disp: 30 tablet, Rfl: 0    Past Medical History:   Diagnosis Date    CAD (coronary artery disease)     Cardiomegaly     Chronic systolic (congestive) heart failure     COVID 09/2022    Diverticulosis     Dizziness and giddiness      "Hypercholesterolemia     Occlusion and stenosis of bilateral carotid arteries     Prostate disorder       Past Surgical History:   Procedure Laterality Date    CARDIAC CATHETERIZATION      08/15/2023  PER DR. JOY    CARDIAC CATHETERIZATION Left 8/15/2023    Procedure: Cardiac Catheterization/Vascular Study;  Surgeon: Paresh Joy MD;  Location:  [facility];  Service: Cardiovascular;  Laterality: Left;    CHOLECYSTECTOMY      HAND SURGERY      HERNIA REPAIR      KIDNEY STONE SURGERY      PACEMAKER IMPLANTATION N/A 2/26/2024    Procedure: Biv PPM Implant (SJM), hold Eliquis 2 days, C&T Dr. Soto;  Surgeon: Gio Pappas MD;  Location: [facility] INVASIVE LOCATION;  Service: Cardiovascular;  Laterality: N/A;     Family History   Problem Relation Age of Onset    Stroke Mother     Hypertension Mother     Heart attack Mother     Cancer Father         STOMACH    Diabetes Father     No Known Problems Sister      Social History     Socioeconomic History    Marital status:    Tobacco Use    Smoking status: Never     Passive exposure: Never    Smokeless tobacco: Never   Vaping Use    Vaping status: Never Used   Substance and Sexual Activity    Alcohol use: No    Drug use: No    Sexual activity: Defer       Objective     Vital Signs:  /76   Pulse 79   Ht 182.9 cm (72\")   Wt 128 kg (283 lb)   SpO2 98%   BMI 38.38 kg/m²   Estimated body mass index is 38.38 kg/m² as calculated from the following:    Height as of this encounter: 182.9 cm (72\").    Weight as of this encounter: 128 kg (283 lb).         Physical Exam  Vitals reviewed.   Constitutional:       Appearance: Normal appearance. He is well-developed.   HENT:      Head: Normocephalic and atraumatic.   Eyes:      General: No scleral icterus.     Pupils: Pupils are equal, round, and reactive to light.   Neck:      Vascular: No carotid bruit.   Cardiovascular:      Rate and Rhythm: Normal rate and regular rhythm.      Pulses: Normal " pulses.           Radial pulses are 2+ on the right side and 2+ on the left side.        Dorsalis pedis pulses are 2+ on the right side and 2+ on the left side.        Posterior tibial pulses are 2+ on the right side and 2+ on the left side.      Heart sounds: Normal heart sounds. No murmur heard.     Comments: Pacemaker  Pulmonary:      Effort: Pulmonary effort is normal.      Breath sounds: Normal breath sounds. No wheezing or rhonchi.   Musculoskeletal:         General: Normal range of motion.      Right lower leg: No edema.      Left lower leg: No edema.   Skin:     General: Skin is warm and dry.      Capillary Refill: Capillary refill takes less than 2 seconds.      Coloration: Skin is not cyanotic.      Nails: There is no clubbing.   Neurological:      Mental Status: He is alert and oriented to person, place, and time.      Motor: No weakness.      Gait: Gait normal.   Psychiatric:         Mood and Affect: Mood normal.         Behavior: Behavior is cooperative.         Thought Content: Thought content normal.         Cognition and Memory: Memory normal.                     Assessment and Plan     Diagnoses and all orders for this visit:    1. Presence of biventricular cardiac pacemaker  Comments:  Good battery life and leads in place.  No episodes.    2. Paroxysmal atrial fibrillation  Comments:  asymptomatic.  Rate controlled. Doing well on Eliquis and BB.        Recommendations: ER if symptoms increase and Report if any new/changing symptoms immediately      Follow Up  Return in about 6 months (around 12/5/2024) for with PM.    NAV Hernandez   06/05/2024     Please note that this explicitly excludes time spent on other separate billable services such as performing procedures or test interpretation, when applicable.    This note was created using dictation software which occasionally transcribes nonsensical phrases. Please contact the provider if any clarification is needed.

## 2024-08-13 RX ORDER — CARVEDILOL 3.12 MG/1
3.12 TABLET ORAL 2 TIMES DAILY
Qty: 180 TABLET | Refills: 1 | Status: SHIPPED | OUTPATIENT
Start: 2024-08-13

## 2024-09-06 RX ORDER — ATORVASTATIN CALCIUM 40 MG/1
40 TABLET, FILM COATED ORAL DAILY
Qty: 90 TABLET | Refills: 1 | Status: SHIPPED | OUTPATIENT
Start: 2024-09-06

## 2024-09-06 RX ORDER — ALBUTEROL SULFATE 90 UG/1
AEROSOL, METERED RESPIRATORY (INHALATION)
Qty: 9 G | Refills: 1 | Status: SHIPPED | OUTPATIENT
Start: 2024-09-06

## 2024-09-06 RX ORDER — CARVEDILOL 6.25 MG/1
6.25 TABLET ORAL 2 TIMES DAILY
Qty: 180 TABLET | Refills: 3 | OUTPATIENT
Start: 2024-09-06

## 2024-10-09 DIAGNOSIS — I50.22 CHRONIC SYSTOLIC (CONGESTIVE) HEART FAILURE: ICD-10-CM

## 2024-10-09 RX ORDER — ALBUTEROL SULFATE 90 UG/1
INHALANT RESPIRATORY (INHALATION)
Qty: 9 G | Refills: 1 | Status: SHIPPED | OUTPATIENT
Start: 2024-10-09

## 2024-10-09 RX ORDER — CARVEDILOL 6.25 MG/1
6.25 TABLET ORAL 2 TIMES DAILY
Qty: 180 TABLET | Refills: 3 | Status: SHIPPED | OUTPATIENT
Start: 2024-10-09

## 2024-10-09 RX ORDER — FUROSEMIDE 40 MG
40 TABLET ORAL DAILY
Qty: 90 TABLET | Refills: 3 | Status: SHIPPED | OUTPATIENT
Start: 2024-10-09

## 2024-10-09 RX ORDER — SACUBITRIL AND VALSARTAN 24; 26 MG/1; MG/1
1 TABLET, FILM COATED ORAL 2 TIMES DAILY
Qty: 60 TABLET | Refills: 2 | Status: SHIPPED | OUTPATIENT
Start: 2024-10-09

## 2024-12-02 RX ORDER — ATORVASTATIN CALCIUM 40 MG/1
40 TABLET, FILM COATED ORAL DAILY
Qty: 90 TABLET | Refills: 1 | Status: SHIPPED | OUTPATIENT
Start: 2024-12-02

## 2024-12-04 ENCOUNTER — OFFICE VISIT (OUTPATIENT)
Dept: CARDIOLOGY | Facility: CLINIC | Age: 84
End: 2024-12-04
Payer: MEDICARE

## 2024-12-04 ENCOUNTER — PATIENT ROUNDING (BHMG ONLY) (OUTPATIENT)
Dept: CARDIOLOGY | Facility: CLINIC | Age: 84
End: 2024-12-04
Payer: MEDICARE

## 2024-12-04 ENCOUNTER — CLINICAL SUPPORT NO REQUIREMENTS (OUTPATIENT)
Dept: CARDIOLOGY | Facility: CLINIC | Age: 84
End: 2024-12-04
Payer: MEDICARE

## 2024-12-04 VITALS
BODY MASS INDEX: 37.38 KG/M2 | WEIGHT: 276 LBS | SYSTOLIC BLOOD PRESSURE: 110 MMHG | DIASTOLIC BLOOD PRESSURE: 62 MMHG | HEART RATE: 62 BPM | OXYGEN SATURATION: 98 % | HEIGHT: 72 IN

## 2024-12-04 DIAGNOSIS — Z95.0 PRESENCE OF BIVENTRICULAR CARDIAC PACEMAKER: Primary | ICD-10-CM

## 2024-12-04 DIAGNOSIS — I48.0 PAROXYSMAL ATRIAL FIBRILLATION: ICD-10-CM

## 2024-12-04 DIAGNOSIS — I50.22 CHRONIC SYSTOLIC (CONGESTIVE) HEART FAILURE: ICD-10-CM

## 2024-12-04 RX ORDER — CARVEDILOL 6.25 MG/1
TABLET ORAL
Qty: 90 TABLET | Refills: 0 | Status: SHIPPED | OUTPATIENT
Start: 2024-12-04

## 2024-12-04 RX ORDER — UREA 10 %
500 LOTION (ML) TOPICAL DAILY
COMMUNITY

## 2024-12-04 RX ORDER — ALBUTEROL SULFATE 90 UG/1
2 INHALANT RESPIRATORY (INHALATION) EVERY 6 HOURS PRN
Qty: 9 G | Refills: 1 | Status: SHIPPED | OUTPATIENT
Start: 2024-12-04
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
## 2024-12-04 NOTE — PROGRESS NOTES
..My name is Bina Kelly and I am the Practice Manager for [facility].    I would like to thank you for being a loyal patient. If you do not mind I would like to ask you a few questions about your recent visit with us.  Please feel free to reply if you wish to provide us with feedback on your first visit with our practice.    First, could you tell me what went well with your recent visit?    Secondly, we are always looking for ways to make our patients' experiences even better.  Do you have any recommendations on ways we may improve?    Finally, overall were you satisfied with your first visit to us as a [facility] facility?    In the next few days, you will be receiving a Patient Experience Survey.      Thank you for taking the time to answer a few questions today.  I hope you have a good day.

## 2024-12-05 VITALS
WEIGHT: 276 LBS | SYSTOLIC BLOOD PRESSURE: 110 MMHG | HEIGHT: 72 IN | BODY MASS INDEX: 37.38 KG/M2 | DIASTOLIC BLOOD PRESSURE: 62 MMHG

## 2024-12-05 PROBLEM — I50.30 CHF WITH LEFT VENTRICULAR DIASTOLIC DYSFUNCTION, NYHA CLASS 1: Status: ACTIVE | Noted: 2024-12-05

## 2024-12-05 NOTE — ASSESSMENT & PLAN NOTE
Pacemaker interrogated in clinic today.  18 episodes of HVR/SVT and having some dizziness/lightheadedness.  Will increase carvedilol 6.25 mg twice daily to 6.25 mg in the morning and 12.5 mg in the evening.  Will check blood pressure in 1 to 2 weeks.  Also due for updated echo.

## 2024-12-05 NOTE — ASSESSMENT & PLAN NOTE
-Echocardiogram 12/12/2023-LVEF is 49%.  Left ventricular diastolic function is consistent with (grade 1) impaired relaxation.  Mild mitral valve stenosis.  Mild dilation of the sinuses of Valsalva.  - [facility] 8/15/23- No angiographically significant coronary artery disease LVEF 40%  Mild (2+) mitral regurgitation    Stable. On medical therapy.  Will update echo.

## 2024-12-06 RX ORDER — SACUBITRIL AND VALSARTAN 24; 26 MG/1; MG/1
1 TABLET, FILM COATED ORAL 2 TIMES DAILY
Qty: 60 TABLET | Refills: 3 | Status: SHIPPED | OUTPATIENT
Start: 2024-12-06

## 2024-12-10 RX ORDER — ALBUTEROL SULFATE 90 UG/1
2 INHALANT RESPIRATORY (INHALATION) EVERY 6 HOURS PRN
Qty: 9 G | Refills: 1 | Status: SHIPPED | OUTPATIENT
Start: 2024-12-10

## 2024-12-11 RX ORDER — ALBUTEROL SULFATE 90 UG/1
2 INHALANT RESPIRATORY (INHALATION) EVERY 6 HOURS PRN
OUTPATIENT
Start: 2024-12-11

## 2024-12-17 ENCOUNTER — OFFICE VISIT (OUTPATIENT)
Dept: CARDIOLOGY | Facility: CLINIC | Age: 84
End: 2024-12-17
Payer: MEDICARE

## 2024-12-17 VITALS
HEIGHT: 72 IN | WEIGHT: 279 LBS | SYSTOLIC BLOOD PRESSURE: 112 MMHG | OXYGEN SATURATION: 98 % | HEART RATE: 80 BPM | BODY MASS INDEX: 37.79 KG/M2 | DIASTOLIC BLOOD PRESSURE: 60 MMHG

## 2024-12-17 DIAGNOSIS — I48.0 PAROXYSMAL ATRIAL FIBRILLATION: ICD-10-CM

## 2024-12-17 DIAGNOSIS — R42 DIZZINESS AND GIDDINESS: Primary | ICD-10-CM

## 2024-12-17 PROCEDURE — 3078F DIAST BP <80 MM HG: CPT | Performed by: NURSE PRACTITIONER

## 2024-12-17 PROCEDURE — 99213 OFFICE O/P EST LOW 20 MIN: CPT | Performed by: NURSE PRACTITIONER

## 2024-12-17 PROCEDURE — 3074F SYST BP LT 130 MM HG: CPT | Performed by: NURSE PRACTITIONER

## 2024-12-17 NOTE — PROGRESS NOTES
Cardiovascular and Sleep Consulting Provider Note     Date:   2024   Name: Gio Gutiérrez  :   1940  PCP: Javi Carlos MD    Chief Complaint   Patient presents with    Congestive Heart Failure     Pt here for two week follow up CHF and hypotension with echo results.Overall, feels his breathing is worse and strength is reduced.       Subjective     History of Present Illness  Gio Gutiérrez is a 84 y.o. male who presents today for follow-up on dizziness and echo.  Echo with normal EF and no significant findings.  He had a recent abnormal device check and it was recommended that we increase beta blocker but we wanted to do so cautiously as patient already has dizziness from the nSVT episodes??? And he gets severely hypotensive VERY easily.  We increased from 6.25mg BID to one in am and two in pm.  In echo room his BP was 100/58 no heart rate given.  Now, after walking across clinic to exam room blood pressure is 112/60 HR 80.  Patient feels his dizziness is related to low blood pressures.    No dizziness, stable edema.  No chest pain.    Advised that I will speak with attending Cardiologist, Dr. MARCIA Soto and have staff call him tomorrow.  Hold one of the two Carvedilol pills tonight.      Dr. Carlos sent him a remote monitoring blood pressure cuff.  His wife said the directions said not to use it if you have a implanted device and wants to know if it is safe for patient to start using.    We will decide when to see him back after I speak with Dr. Soto.    Will need to call - has [employer] but cannot access it.      Cardiology/sleep history  1. Atrial fib- new onset 2023, on eliquis, paroxsymal, in sinus rhythm on EKGs since  2. HFrEF -   -Echocardiogram 2023-LVEF is 49%.  Left ventricular diastolic function is consistent with (grade 1) impaired relaxation.  Mild mitral valve stenosis.  Mild dilation of the sinuses of Valsalva.  - [facility] 8/15/23- No angiographically significant coronary artery  disease LVEF 40%  Mild (2+) mitral regurgitation  3. Hyperlipidemia  4.  Hypertension  5.Biventricular pacemaker placed 2/2024 for chronic systolic heart failure/nonischemic cardiomyopathy by Dr. Pappas.    12/17/24  Echo - Left ventricular systolic function is normal. Calculated left ventricular EF = 51.4% Left ventricular ejection fraction appears to be 51 - 55%.  ·  Left ventricular diastolic function is consistent with (grade I) impaired relaxation.  ·  The left atrial cavity is dilated.  ·  Left atrial volume is mildly increased.  ·  The right atrial cavity is borderline dilated.  ·  Estimated right ventricular systolic pressure from tricuspid regurgitation is normal (<35 mmHg).  ·  Mild dilation of the sinuses of Valsalva is present.      4/16/2024 Labs- Crea 0.91, EGFR 83, K 4.1, Ha1c 7.1,     Carotid duplex 1/4/2023- <50% stenosis bilaterally.    Declined HST/PSG- 6/2023    No Known Allergies    Current Outpatient Medications:     acetaminophen (TYLENOL) 325 MG tablet, Take 2 tablets by mouth Every 6 (Six) Hours As Needed for Mild Pain., Disp: , Rfl:     albuterol sulfate  (90 Base) MCG/ACT inhaler, Inhale 2 puffs Every 6 (Six) Hours As Needed for Wheezing., Disp: 9 g, Rfl: 1    atorvastatin (LIPITOR) 40 MG tablet, TAKE 1 TABLET BY MOUTH EVERY DAY, Disp: 90 tablet, Rfl: 1    carvedilol (COREG) 6.25 MG tablet, Take one pill in the morning and two in the evening., Disp: 90 tablet, Rfl: 0    citalopram (CeleXA) 20 MG tablet, Take 1 tablet by mouth Daily., Disp: , Rfl: 1    Eliquis 5 MG tablet tablet, Take 1 tablet by mouth Every 12 (Twelve) Hours., Disp: 180 tablet, Rfl: 1    Entresto 24-26 MG tablet, TAKE 1 TABLET BY MOUTH TWICE A DAY, Disp: 60 tablet, Rfl: 3    finasteride (PROSCAR) 5 MG tablet, Take 1 tablet by mouth Daily., Disp: , Rfl: 2    furosemide (LASIX) 40 MG tablet, TAKE 1 TABLET BY MOUTH EVERY DAY, Disp: 90 tablet, Rfl: 3    metFORMIN (GLUCOPHAGE) 500 MG tablet, Take 1 tablet by mouth 2 (Two)  Times a Day With Meals., Disp: , Rfl:     potassium chloride (K-DUR,KLOR-CON) 20 MEQ CR tablet, Take 1 tablet by mouth 2 (Two) Times a Day., Disp: 60 tablet, Rfl: 11    tamsulosin (FLOMAX) 0.4 MG capsule 24 hr capsule, Take 2 capsules by mouth Daily., Disp: , Rfl: 1    vitamin B-12 (CYANOCOBALAMIN) 500 MCG tablet, Take 1 tablet by mouth Daily., Disp: , Rfl:     Past Medical History:   Diagnosis Date    CAD (coronary artery disease)     Cardiomegaly     Chronic systolic (congestive) heart failure     COVID 09/2022    Diverticulosis     Dizziness and giddiness     Hypercholesterolemia     Occlusion and stenosis of bilateral carotid arteries     Prostate disorder       Past Surgical History:   Procedure Laterality Date    CARDIAC CATHETERIZATION      08/15/2023  PER DR. JOY    CARDIAC CATHETERIZATION Left 8/15/2023    Procedure: Cardiac Catheterization/Vascular Study;  Surgeon: Paresh Joy MD;  Location:  [facility];  Service: Cardiovascular;  Laterality: Left;    CHOLECYSTECTOMY      HAND SURGERY      HERNIA REPAIR      KIDNEY STONE SURGERY      PACEMAKER IMPLANTATION N/A 2/26/2024    Procedure: Biv PPM Implant ([facility]), hold Eliquis 2 days, C&T Dr. Soto;  Surgeon: Gio Pappas MD;  Location:  [facility];  Service: Cardiovascular;  Laterality: N/A;     Family History   Problem Relation Age of Onset    Stroke Mother     Hypertension Mother     Heart attack Mother     Cancer Father         STOMACH    Diabetes Father     No Known Problems Sister      Social History     Socioeconomic History    Marital status:    Tobacco Use    Smoking status: Never     Passive exposure: Never    Smokeless tobacco: Never   Vaping Use    Vaping status: Never Used   Substance and Sexual Activity    Alcohol use: No    Drug use: No    Sexual activity: Defer       Objective     Vital Signs:  /60 (BP Location: Right arm, Patient Position: Sitting, Cuff Size: Adult)   Pulse 80   Ht 182.9 cm  "(72\")   Wt 127 kg (279 lb)   SpO2 98%   BMI 37.84 kg/m²   Estimated body mass index is 37.84 kg/m² as calculated from the following:    Height as of this encounter: 182.9 cm (72\").    Weight as of this encounter: 127 kg (279 lb).         Physical Exam  Vitals reviewed.   Constitutional:       Appearance: Normal appearance. He is well-developed.   HENT:      Head: Normocephalic and atraumatic.   Eyes:      General: No scleral icterus.     Pupils: Pupils are equal, round, and reactive to light.   Cardiovascular:      Rate and Rhythm: Normal rate and regular rhythm.      Heart sounds: Normal heart sounds. No murmur heard.     Comments: Device in place  Pulmonary:      Effort: Pulmonary effort is normal.      Breath sounds: Normal breath sounds. No wheezing or rhonchi.   Musculoskeletal:         General: Normal range of motion.      Right lower leg: No edema.      Left lower leg: No edema.   Skin:     General: Skin is warm and dry.      Capillary Refill: Capillary refill takes less than 2 seconds.      Coloration: Skin is not cyanotic.      Nails: There is no clubbing.   Neurological:      Mental Status: He is alert and oriented to person, place, and time.      Motor: No weakness.      Gait: Gait normal.   Psychiatric:         Mood and Affect: Mood normal.         Behavior: Behavior is cooperative.         Thought Content: Thought content normal.         Cognition and Memory: Memory normal.                     Assessment and Plan     Assessment & Plan  Dizziness and giddiness  Sending message to Dr. Soto       Paroxysmal atrial fibrillation  Well controlled              Recommendations: ER if symptoms increase and Report if any new/changing symptoms immediately      Follow Up  No follow-ups on file.    NAV Mitchell  Cardiology and Sleep [facility]  12/17/2024     Please note that this explicitly excludes time spent on other separate billable services such as performing procedures or test " interpretation, when applicable.    This note was created using dictation software which occasionally transcribes nonsensical phrases. Please contact the provider if any clarification is needed.

## 2024-12-19 ENCOUNTER — TELEPHONE (OUTPATIENT)
Age: 84
End: 2024-12-19
Payer: MEDICARE

## 2024-12-19 NOTE — TELEPHONE ENCOUNTER
----- Message from Maranda Lepe sent at 12/19/2024 11:40 AM EST -----  Please call patient and let him know and see how he is doing, Thanks.  ----- Message -----  From: Aziza Soto MD  Sent: 12/18/2024   5:53 PM EST  To: NAV Armando    Okay to use blood pressure cuff.  The heart rate estimates may not be accurate. Interference risk is low.     Agreed to lower carvedilol dose.  ----- Message -----  From: Maranda Lepe APRN  Sent: 12/18/2024  11:13 AM EST  To: Aziza Soto MD    Gio Gutiérrez is a 84 y.o. male who presents today for follow-up on dizziness and echo.  Echo with normal EF and no significant findings.  He had a recent abnormal device check and it was recommended that we increase beta blocker but we wanted to do so cautiously as patient already has dizziness from the nSVT episodes??? And he gets severely hypotensive VERY easily.  We increased from 6.25mg BID to one in am and two in pm.  In echo room his BP was 100/58 no heart rate given.  Now, after walking across clinic to exam room blood pressure is 112/60 HR 80.  Patient feels his dizziness is related to low blood pressures.     No dizziness, stable edema.  No chest pain.   Advised that I will speak with attending Cardiologist, Dr. MARCIA Soto and have staff call him tomorrow.  Hold one of the two Carvedilol pills tonight.      Dr. Carlos sent him a remote monitoring blood pressure cuff.  His wife said the directions said not to use it if you have a implanted device and wants to know if it is safe for patient to start using.     We will decide when to see him back after I speak with Dr. Soto.

## 2024-12-30 ENCOUNTER — TELEPHONE (OUTPATIENT)
Dept: CARDIOLOGY | Facility: CLINIC | Age: 84
End: 2024-12-30
Payer: MEDICARE

## 2024-12-30 DIAGNOSIS — R06.02 SOB (SHORTNESS OF BREATH): Primary | ICD-10-CM

## 2024-12-30 RX ORDER — BUDESONIDE AND FORMOTEROL FUMARATE DIHYDRATE 80; 4.5 UG/1; UG/1
2 AEROSOL RESPIRATORY (INHALATION)
Qty: 1 EACH | Refills: 11 | Status: SHIPPED | OUTPATIENT
Start: 2024-12-30

## 2024-12-30 NOTE — TELEPHONE ENCOUNTER
Caller: ANDREAS ROLWEY    Relationship: Emergency Contact    Best call back number: [phone]  A  What is the best time to reach you: ANY    Who are you requesting to speak with (clinical staff, provider,  specific staff member): CLINICAL     What was the call regarding: PATIENT IS HAVING FATIGUE AND SHORTNESS OF BREATHE, IS STATING THEY ARE NOT HAVING ANY OF OUR RED FLAG WORDS, PATIENT WANTS TO DISCUSS WHAT IS OCCURRING.     Is it okay if the provider responds through [employer]: PLEASE CALL.

## 2024-12-30 NOTE — TELEPHONE ENCOUNTER
Spoke to patient's wife she states he has cut down on the dose. But still has been SOB a lot. They would like a call with treatment plan once discussed. Patient is currently taking albuterol as needed but would like something for once a day long acting. Please advise.

## 2024-12-31 RX ORDER — CARVEDILOL 6.25 MG/1
TABLET ORAL
Qty: 90 TABLET | Refills: 0 | Status: SHIPPED | OUTPATIENT
Start: 2024-12-31

## 2025-01-03 ENCOUNTER — TELEPHONE (OUTPATIENT)
Age: 85
End: 2025-01-03
Payer: MEDICARE

## 2025-01-03 ENCOUNTER — TELEPHONE (OUTPATIENT)
Dept: CARDIOLOGY | Facility: CLINIC | Age: 85
End: 2025-01-03
Payer: MEDICARE

## 2025-01-03 NOTE — TELEPHONE ENCOUNTER
----- Message from Aziza Soto sent at 12/18/2024  5:32 PM EST -----  Okay to use blood pressure cuff.  The heart rate estimates may not be accurate. Interference risk is low.     Agreed to lower carvedilol dose.  ----- Message -----  From: Maranda Lepe APRN  Sent: 12/18/2024  11:13 AM EST  To: Aziza Soto MD    Gio Gutiérrez is a 84 y.o. male who presents today for follow-up on dizziness and echo.  Echo with normal EF and no significant findings.  He had a recent abnormal device check and it was recommended that we increase beta blocker but we wanted to do so cautiously as patient already has dizziness from the nSVT episodes??? And he gets severely hypotensive VERY easily.  We increased from 6.25mg BID to one in am and two in pm.  In echo room his BP was 100/58 no heart rate given.  Now, after walking across clinic to exam room blood pressure is 112/60 HR 80.  Patient feels his dizziness is related to low blood pressures.     No dizziness, stable edema.  No chest pain.   Advised that I will speak with attending Cardiologist, Dr. MARCIA Soto and have staff call him tomorrow.  Hold one of the two Carvedilol pills tonight.      Dr. Carlos sent him a remote monitoring blood pressure cuff.  His wife said the directions said not to use it if you have a implanted device and wants to know if it is safe for patient to start using.     We will decide when to see him back after I speak with Dr. Soto.   clear bilaterally.  Pupils equal, round, and reactive to light.

## 2025-01-03 NOTE — TELEPHONE ENCOUNTER
Miya from Dr. Carlos office called to clarify Carvedilol and Budesonide orders. Patient and wife believe that he should be taking Carvedilol 6.25 mg twice daily but the new refill instructions say 1 tablet in the morning and 2 at night. They believe that order was changed a his 12/17/24 visit. Budesonide- prescription instructions say 2 puffs twice daily but Patient and wife state that someone from our office called them and told him to only take 2 puffs in the morning and not a night time dose. I do not see any record of that conversation in his chart.     Miya (Dr. Carlos office) states that Mr Gutiérrez is still staying short of breath. Please advise.

## 2025-01-03 NOTE — TELEPHONE ENCOUNTER
Sanjuanita's note says to hold one at night. So should he be taking 1 tablet am and 1 tablet pm at this time until discuss with Dr. Soto?     The medication that got sent in was just a refill from request from pharmacy. Sanjuanita can you help me with this, thanks.

## 2025-01-03 NOTE — TELEPHONE ENCOUNTER
CALLED PATIENT NO ANSWER. LVM TO CALL OFFICE. FOLLOW UP WITH DR MORAN SCHEDULED 02/19/2025 AT 12:30

## 2025-01-28 RX ORDER — CARVEDILOL 6.25 MG/1
TABLET ORAL
Qty: 90 TABLET | Refills: 0 | Status: SHIPPED | OUTPATIENT
Start: 2025-01-28

## 2025-02-19 ENCOUNTER — CLINICAL SUPPORT NO REQUIREMENTS (OUTPATIENT)
Dept: CARDIOLOGY | Facility: CLINIC | Age: 85
End: 2025-02-19
Payer: MEDICARE

## 2025-02-19 ENCOUNTER — TELEPHONE (OUTPATIENT)
Dept: CARDIOLOGY | Facility: CLINIC | Age: 85
End: 2025-02-19
Payer: MEDICARE

## 2025-02-19 ENCOUNTER — OFFICE VISIT (OUTPATIENT)
Dept: CARDIOLOGY | Facility: CLINIC | Age: 85
End: 2025-02-19
Payer: MEDICARE

## 2025-02-19 VITALS
HEART RATE: 72 BPM | OXYGEN SATURATION: 98 % | HEIGHT: 72 IN | DIASTOLIC BLOOD PRESSURE: 60 MMHG | SYSTOLIC BLOOD PRESSURE: 94 MMHG | WEIGHT: 281 LBS | BODY MASS INDEX: 38.06 KG/M2

## 2025-02-19 DIAGNOSIS — I50.22 CHRONIC SYSTOLIC (CONGESTIVE) HEART FAILURE: ICD-10-CM

## 2025-02-19 DIAGNOSIS — Z95.0 PRESENCE OF BIVENTRICULAR CARDIAC PACEMAKER: ICD-10-CM

## 2025-02-19 DIAGNOSIS — I48.0 PAROXYSMAL ATRIAL FIBRILLATION: ICD-10-CM

## 2025-02-19 DIAGNOSIS — R42 DIZZINESS AND GIDDINESS: Primary | ICD-10-CM

## 2025-02-19 DIAGNOSIS — Z95.0 PRESENCE OF BIVENTRICULAR CARDIAC PACEMAKER: Primary | ICD-10-CM

## 2025-02-19 PROCEDURE — 3074F SYST BP LT 130 MM HG: CPT | Performed by: INTERNAL MEDICINE

## 2025-02-19 PROCEDURE — 3078F DIAST BP <80 MM HG: CPT | Performed by: INTERNAL MEDICINE

## 2025-02-19 PROCEDURE — 99214 OFFICE O/P EST MOD 30 MIN: CPT | Performed by: INTERNAL MEDICINE

## 2025-02-19 PROCEDURE — 93000 ELECTROCARDIOGRAM COMPLETE: CPT | Performed by: INTERNAL MEDICINE

## 2025-02-19 RX ORDER — FUROSEMIDE 40 MG/1
40 TABLET ORAL DAILY PRN
Start: 2025-02-19

## 2025-02-19 RX ORDER — TEMAZEPAM 30 MG/1
30 CAPSULE ORAL
COMMUNITY
Start: 2025-01-27

## 2025-02-19 RX ORDER — POTASSIUM CHLORIDE 1500 MG/1
20 TABLET, EXTENDED RELEASE ORAL DAILY PRN
Start: 2025-02-19

## 2025-02-19 NOTE — TELEPHONE ENCOUNTER
FINANCIAL ASSISTANCE APPLICATIONS SUBMITTED TO [employer] FOR ELIQUIS AND [employer] FOR ENTRESTO.

## 2025-02-19 NOTE — PROGRESS NOTES
Cardiovascular [facility] Provider Note     Date:   2025   Name: Gio Gutiérrez  :   1940  PCP: Javi Carlos MD    Chief Complaint   Patient presents with    Pacemaker Check    CHRONIC SYSTOLIC HEART FAILURE     PT STATES HE IS HERE TODAY FOR FOLLOW UP. NO CHEST PAIN BUT DOES HAVE SOA FOR THE PAST 5-6 MONTHS.       Subjective     History of Present Illness  Gio Gutiérrez is a 84 y.o. male who presents today for follow up heart failure, pacemaker check  Does report shortness of air.  Reports no chest pain or palpitations.  Has no dizziness or syncope.  Does sometimes get lightheaded when he stands up too quickly.  Dr. Carlos has been monitoring his blood pressure and it has been a little low.      Cardiology/sleep history  1. Atrial fib- new onset 2023, on eliquis, paroxsymal, in sinus rhythm on EKGs since  2. HFrEF -   -Echocardiogram 2023-LVEF is 49%.  Left ventricular diastolic function is consistent with (grade 1) impaired relaxation.  Mild mitral valve stenosis.  Mild dilation of the sinuses of Valsalva.  - [facility] 8/15/23- No angiographically significant coronary artery disease LVEF 40%  Mild (2+) mitral regurgitation  3. Hyperlipidemia  4.  Hypertension  5.Biventricular pacemaker placed 2024 for chronic systolic heart failure/nonischemic cardiomyopathy by Dr. Pappas.    24  Echo - Left ventricular systolic function is normal. Calculated left ventricular EF = 51.4% Left ventricular ejection fraction appears to be 51 - 55%.  ·  Left ventricular diastolic function is consistent with (grade I) impaired relaxation.  ·  The left atrial cavity is dilated.  ·  Left atrial volume is mildly increased.  ·  The right atrial cavity is borderline dilated.  ·  Estimated right ventricular systolic pressure from tricuspid regurgitation is normal (<35 mmHg).  ·  Mild dilation of the sinuses of Valsalva is present.      2024 Labs- Crea 0.91, EGFR 83, K 4.1, Ha1c 7.1,     Carotid duplex  1/4/2023- <50% stenosis bilaterally.    Declined HST/PSG- 6/2023    No Known Allergies    Current Outpatient Medications:     acetaminophen (TYLENOL) 325 MG tablet, Take 2 tablets by mouth Every 6 (Six) Hours As Needed for Mild Pain., Disp: , Rfl:     albuterol sulfate  (90 Base) MCG/ACT inhaler, Inhale 2 puffs Every 6 (Six) Hours As Needed for Wheezing., Disp: 9 g, Rfl: 1    atorvastatin (LIPITOR) 40 MG tablet, TAKE 1 TABLET BY MOUTH EVERY DAY, Disp: 90 tablet, Rfl: 1    budesonide-formoterol (SYMBICORT) 80-4.5 MCG/ACT inhaler, Inhale 2 puffs 2 (Two) Times a Day., Disp: 1 each, Rfl: 11    carvedilol (COREG) 6.25 MG tablet, TAKE ONE PILL IN THE MORNING AND TWO IN THE EVENING., Disp: 90 tablet, Rfl: 0    citalopram (CeleXA) 20 MG tablet, Take 1 tablet by mouth Daily., Disp: , Rfl: 1    Eliquis 5 MG tablet tablet, Take 1 tablet by mouth Every 12 (Twelve) Hours., Disp: 180 tablet, Rfl: 1    Entresto 24-26 MG tablet, TAKE 1 TABLET BY MOUTH TWICE A DAY, Disp: 60 tablet, Rfl: 3    finasteride (PROSCAR) 5 MG tablet, Take 1 tablet by mouth Daily., Disp: , Rfl: 2    furosemide (LASIX) 40 MG tablet, Take 1 tablet by mouth Daily As Needed (swelling)., Disp: , Rfl:     metFORMIN (GLUCOPHAGE) 500 MG tablet, Take 1 tablet by mouth 2 (Two) Times a Day With Meals., Disp: , Rfl:     potassium chloride (KLOR-CON M20) 20 MEQ CR tablet, Take 1 tablet by mouth Daily As Needed (swelling)., Disp: , Rfl:     tamsulosin (FLOMAX) 0.4 MG capsule 24 hr capsule, Take 2 capsules by mouth Daily., Disp: , Rfl: 1    temazepam (RESTORIL) 30 MG capsule, Take 1 capsule by mouth every night at bedtime., Disp: , Rfl:     Past Medical History:   Diagnosis Date    CAD (coronary artery disease)     Cardiomegaly     Chronic systolic (congestive) heart failure     COVID 09/2022    Diverticulosis     Dizziness and giddiness     Hypercholesterolemia     Occlusion and stenosis of bilateral carotid arteries     Prostate disorder       Past Surgical  "History:   Procedure Laterality Date    CARDIAC CATHETERIZATION      08/15/2023  PER DR. JOY    CARDIAC CATHETERIZATION Left 8/15/2023    Procedure: Cardiac Catheterization/Vascular Study;  Surgeon: Paresh Joy MD;  Location:  [facility];  Service: Cardiovascular;  Laterality: Left;    CHOLECYSTECTOMY      HAND SURGERY      HERNIA REPAIR      KIDNEY STONE SURGERY      PACEMAKER IMPLANTATION N/A 2/26/2024    Procedure: Biv PPM Implant ([facility]), hold Eliquis 2 days, C&T Dr. Soto;  Surgeon: Gio Pappas MD;  Location: [facility] EP INVASIVE LOCATION;  Service: Cardiovascular;  Laterality: N/A;     Family History   Problem Relation Age of Onset    Stroke Mother     Hypertension Mother     Heart attack Mother     Cancer Father         STOMACH    Diabetes Father     No Known Problems Sister      Social History     Socioeconomic History    Marital status:    Tobacco Use    Smoking status: Never     Passive exposure: Never    Smokeless tobacco: Never   Vaping Use    Vaping status: Never Used   Substance and Sexual Activity    Alcohol use: No    Drug use: No    Sexual activity: Defer       Objective     Vital Signs:  BP 94/60 (BP Location: Right arm, Patient Position: Sitting, Cuff Size: Large Adult)   Pulse 72   Ht 182.9 cm (72\")   Wt 127 kg (281 lb)   SpO2 98%   BMI 38.11 kg/m²   Estimated body mass index is 38.11 kg/m² as calculated from the following:    Height as of this encounter: 182.9 cm (72\").    Weight as of this encounter: 127 kg (281 lb).         Physical Exam  Constitutional:       Appearance: Normal appearance. He is well-developed.   HENT:      Head: Normocephalic and atraumatic.   Eyes:      General: No scleral icterus.     Pupils: Pupils are equal, round, and reactive to light.   Cardiovascular:      Rate and Rhythm: Normal rate and regular rhythm.      Heart sounds: Normal heart sounds. No murmur heard.  Pulmonary:      Breath sounds: Normal breath sounds. No wheezing or " rhonchi.   Musculoskeletal:      Right lower leg: No edema.      Left lower leg: No edema.   Skin:     Capillary Refill: Capillary refill takes less than 2 seconds.      Coloration: Skin is not cyanotic.      Nails: There is no clubbing.   Neurological:      Mental Status: He is alert and oriented to person, place, and time.      Motor: No weakness.      Gait: Gait normal.   Psychiatric:         Mood and Affect: Mood normal.         Behavior: Behavior is cooperative.         Thought Content: Thought content normal.         Cognition and Memory: Memory normal.               Assessment and Plan     Assessment & Plan  Chronic systolic (congestive) heart failure  Euvolemic and on medical therapy.  Change Lasix to as needed and potassium to go only with Lasix.  Follow-up 6 months to assess symptoms.      Orders:    potassium chloride (KLOR-CON M20) 20 MEQ CR tablet; Take 1 tablet by mouth Daily As Needed (swelling).    furosemide (LASIX) 40 MG tablet; Take 1 tablet by mouth Daily As Needed (swelling).    ECG 12 Lead; Future    ECG 12 Lead    Dizziness and giddiness  Orthostatic symptoms may improve with changing Lasix to as needed.       Paroxysmal atrial fibrillation  Not on recent pacemaker download.       Presence of biventricular cardiac pacemaker  RV lead has slightly low sensing.  Adjustments made.  Will need to monitor sensing in the future.                    Follow Up  Return in about 6 weeks (around 4/2/2025) for PM/ICD check, Recheck blood pressure.    DANIEL Soto MD  Cardiology and Sleep [facility]  02/19/2025     Please note that this explicitly excludes time spent on other separate billable services such as performing procedures or test interpretation, when applicable.    This note was created using dictation software which occasionally transcribes nonsensical phrases. Please contact the provider if any clarification is needed.

## 2025-02-19 NOTE — ASSESSMENT & PLAN NOTE
RV lead has slightly low sensing.  Adjustments made.  Will need to monitor sensing in the future.

## 2025-02-19 NOTE — ASSESSMENT & PLAN NOTE
Euvolemic and on medical therapy.  Change Lasix to as needed and potassium to go only with Lasix.  Follow-up 6 months to assess symptoms.      Orders:    potassium chloride (KLOR-CON M20) 20 MEQ CR tablet; Take 1 tablet by mouth Daily As Needed (swelling).    furosemide (LASIX) 40 MG tablet; Take 1 tablet by mouth Daily As Needed (swelling).    ECG 12 Lead; Future    ECG 12 Lead

## 2025-02-20 ENCOUNTER — TELEPHONE (OUTPATIENT)
Dept: CARDIOLOGY | Facility: CLINIC | Age: 85
End: 2025-02-20
Payer: MEDICARE

## 2025-02-20 NOTE — TELEPHONE ENCOUNTER
CALLED PATIENT TO DISCUSS [employer] FINANCIAL ASSISTANCE DENIAL. NO ANSWER, LVM. I MAILED THE DENIAL LETTER WITH NEXT STEP INFORMATION TO PATIENT. HUB OK TO RELAY.

## 2025-03-10 ENCOUNTER — TELEPHONE (OUTPATIENT)
Dept: CARDIOLOGY | Facility: CLINIC | Age: 85
End: 2025-03-10

## 2025-03-10 NOTE — TELEPHONE ENCOUNTER
Caller Name: ANDREAS ROWLEY      Relationship: Emergency Contact      Best Contact Number: [phone]      Patient is requesting samples of ELIQUIS       How many days of medication do you have left? 7 DAYS        Additional Information: PATIENT NEEDING TO ORDER THE ELIQUIS FROM [facility], PLEASE ALSO ADVISE, AS THEY NEED THESE ASAP.

## 2025-03-10 NOTE — TELEPHONE ENCOUNTER
ATTEMPTED TO CALL PATIENT, UNABLE TO LVM.    IT LOOKS LIKE PATIENT WAS DENIED FOR PATIENT ASSISTANCE. PATIENT IS MORE THAN WELCOME TO COME  SAMPLES TOMORROW SINCE HE HAS BEEN DENIED.     HUB OK TO READ

## 2025-03-11 DIAGNOSIS — I48.0 PAROXYSMAL ATRIAL FIBRILLATION: ICD-10-CM

## 2025-03-11 RX ORDER — APIXABAN 5 MG/1
5 TABLET, FILM COATED ORAL EVERY 12 HOURS SCHEDULED
Qty: 56 TABLET | Refills: 0 | COMMUNITY
Start: 2025-03-11

## 2025-03-11 NOTE — TELEPHONE ENCOUNTER
Name: ANDREAS ROWLEY      Relationship: Emergency Contact      Best Callback Number: [phone]      HUB PROVIDED THE RELAY MESSAGE FROM THE OFFICE      PATIENT: VOICED UNDERSTANDING AND HAS NO FURTHER QUESTIONS AT THIS TIME    ADDITIONAL INFORMATION: PATIENTS SON WILL BY BY LATER TO  SAMPLES AND TO DISCUSS WHY THE ASSISTANCE WAS DENIED.

## 2025-03-15 DIAGNOSIS — I50.22 CHRONIC SYSTOLIC (CONGESTIVE) HEART FAILURE: ICD-10-CM

## 2025-03-17 RX ORDER — POTASSIUM CHLORIDE 1500 MG/1
20 TABLET, EXTENDED RELEASE ORAL 2 TIMES DAILY
Qty: 60 TABLET | Refills: 3 | Status: SHIPPED | OUTPATIENT
Start: 2025-03-17

## 2025-04-14 ENCOUNTER — OFFICE VISIT (OUTPATIENT)
Dept: CARDIOLOGY | Facility: CLINIC | Age: 85
End: 2025-04-14
Payer: MEDICARE

## 2025-04-14 ENCOUNTER — CLINICAL SUPPORT NO REQUIREMENTS (OUTPATIENT)
Dept: CARDIOLOGY | Facility: CLINIC | Age: 85
End: 2025-04-14
Payer: MEDICARE

## 2025-04-14 VITALS
BODY MASS INDEX: 37.65 KG/M2 | DIASTOLIC BLOOD PRESSURE: 60 MMHG | WEIGHT: 278 LBS | HEIGHT: 72 IN | SYSTOLIC BLOOD PRESSURE: 100 MMHG | OXYGEN SATURATION: 96 % | HEART RATE: 81 BPM

## 2025-04-14 DIAGNOSIS — Z95.0 PRESENCE OF BIVENTRICULAR CARDIAC PACEMAKER: ICD-10-CM

## 2025-04-14 DIAGNOSIS — I50.22 CHRONIC SYSTOLIC (CONGESTIVE) HEART FAILURE: Primary | ICD-10-CM

## 2025-04-14 DIAGNOSIS — R10.9 STOMACH PAIN: ICD-10-CM

## 2025-04-14 DIAGNOSIS — K59.1 FUNCTIONAL DIARRHEA: ICD-10-CM

## 2025-04-14 DIAGNOSIS — I10 ESSENTIAL HYPERTENSION: ICD-10-CM

## 2025-04-14 DIAGNOSIS — I48.0 PAROXYSMAL ATRIAL FIBRILLATION: ICD-10-CM

## 2025-04-14 DIAGNOSIS — Z95.0 PRESENCE OF BIVENTRICULAR CARDIAC PACEMAKER: Primary | ICD-10-CM

## 2025-04-14 PROCEDURE — 99214 OFFICE O/P EST MOD 30 MIN: CPT | Performed by: INTERNAL MEDICINE

## 2025-04-14 PROCEDURE — 3074F SYST BP LT 130 MM HG: CPT | Performed by: INTERNAL MEDICINE

## 2025-04-14 PROCEDURE — 3078F DIAST BP <80 MM HG: CPT | Performed by: INTERNAL MEDICINE

## 2025-04-14 PROCEDURE — G2211 COMPLEX E/M VISIT ADD ON: HCPCS | Performed by: INTERNAL MEDICINE

## 2025-04-14 NOTE — PROGRESS NOTES
Cardiovascular and Sleep Consulting Provider Note     Date:   2025   Name: Gio Gutiérrez  :   1940  PCP: Javi Carlos MD    Chief Complaint   Patient presents with   • Pacemaker Check   • Atrial Fibrillation     Pt states he is here today for follow up atrial fib. No chest pain. He states he always has SOA and uses an inhaler.        Subjective     History of Present Illness  Gio Gutiérrez is a 84 y.o. male with A fib, CHF, CRTP who presents today for follow up.   Reports that shortness of breath is a step up from where he was last year.   Reports that still has a little dizziness. Reports better than it was 2 years ago and attributes it to when he has been bent over and coming back up.  No syncope.  Reports no chest pain or palpitations.  Reports no swelling. Does have some if he is wearing tighter socks.  Has reported lower abdominal pain. States that it has been hurting since he went fishing and thought he had pulled something in there from a surgery that he had years ago. Reports bowel's have been moving just has been having diarrhea.      Cardiology/sleep history  1. Atrial fib- new onset 2023, on eliquis, paroxsymal, in sinus rhythm on EKGs since  2. HFrEF -   -Echocardiogram 2023-LVEF is 49%.  Left ventricular diastolic function is consistent with (grade 1) impaired relaxation.  Mild mitral valve stenosis.  Mild dilation of the sinuses of Valsalva.  - [facility] 8/15/23- No angiographically significant coronary artery disease LVEF 40%  Mild (2+) mitral regurgitation  3. Hyperlipidemia  4.  Hypertension  5.Biventricular pacemaker placed 2024 for chronic systolic heart failure/nonischemic cardiomyopathy by Dr. Pappas.    24  Echo - Left ventricular systolic function is normal. Calculated left ventricular EF = 51.4% Left ventricular ejection fraction appears to be 51 - 55%.  ·  Left ventricular diastolic function is consistent with (grade I) impaired relaxation.  ·  The left atrial cavity  is dilated.  ·  Left atrial volume is mildly increased.  ·  The right atrial cavity is borderline dilated.  ·  Estimated right ventricular systolic pressure from tricuspid regurgitation is normal (<35 mmHg).  ·  Mild dilation of the sinuses of Valsalva is present.      4/16/2024 Labs- Crea 0.91, EGFR 83, K 4.1, Ha1c 7.1,     Carotid duplex 1/4/2023- <50% stenosis bilaterally.    Declined HST/PSG- 6/2023    No Known Allergies    Current Outpatient Medications:   •  acetaminophen (TYLENOL) 325 MG tablet, Take 2 tablets by mouth Every 6 (Six) Hours As Needed for Mild Pain., Disp: , Rfl:   •  albuterol sulfate  (90 Base) MCG/ACT inhaler, Inhale 2 puffs Every 6 (Six) Hours As Needed for Wheezing., Disp: 9 g, Rfl: 1  •  atorvastatin (LIPITOR) 40 MG tablet, TAKE 1 TABLET BY MOUTH EVERY DAY, Disp: 90 tablet, Rfl: 1  •  budesonide-formoterol (SYMBICORT) 80-4.5 MCG/ACT inhaler, Inhale 2 puffs 2 (Two) Times a Day., Disp: 1 each, Rfl: 11  •  carvedilol (COREG) 6.25 MG tablet, TAKE ONE PILL IN THE MORNING AND TWO IN THE EVENING. (Patient taking differently: Take 1 tablet by mouth 2 (Two) Times a Day With Meals. Take one pill in the morning and two in the evening.), Disp: 90 tablet, Rfl: 0  •  citalopram (CeleXA) 20 MG tablet, Take 1 tablet by mouth Daily., Disp: , Rfl: 1  •  Eliquis 5 MG tablet tablet, Take 1 tablet by mouth Every 12 (Twelve) Hours., Disp: 56 tablet, Rfl: 0  •  Entresto 24-26 MG tablet, TAKE 1 TABLET BY MOUTH TWICE A DAY, Disp: 60 tablet, Rfl: 3  •  finasteride (PROSCAR) 5 MG tablet, Take 1 tablet by mouth Daily., Disp: , Rfl: 2  •  furosemide (LASIX) 40 MG tablet, Take 1 tablet by mouth Daily As Needed (swelling)., Disp: , Rfl:   •  Klor-Con M20 20 MEQ CR tablet, TAKE 1 TABLET BY MOUTH TWICE A DAY, Disp: 60 tablet, Rfl: 3  •  metFORMIN (GLUCOPHAGE) 500 MG tablet, Take 1 tablet by mouth 2 (Two) Times a Day With Meals., Disp: , Rfl:   •  tamsulosin (FLOMAX) 0.4 MG capsule 24 hr capsule, Take 2 capsules by  "mouth Daily., Disp: , Rfl: 1  •  temazepam (RESTORIL) 30 MG capsule, Take 1 capsule by mouth every night at bedtime., Disp: , Rfl:     Past Medical History:   Diagnosis Date   • Atrial fibrillation    • CAD (coronary artery disease)    • Cardiomegaly    • Chronic systolic (congestive) heart failure    • COVID 09/2022   • Diabetes mellitus    • Diverticulosis    • Dizziness and giddiness    • Hypercholesterolemia    • Occlusion and stenosis of bilateral carotid arteries    • Prostate disorder       Past Surgical History:   Procedure Laterality Date   • CARDIAC CATHETERIZATION      08/15/2023  PER DR. JOY   • CARDIAC CATHETERIZATION Left 8/15/2023    Procedure: Cardiac Catheterization/Vascular Study;  Surgeon: Paresh Joy MD;  Location: [facility] CATH INVASIVE LOCATION;  Service: Cardiovascular;  Laterality: Left;   • CHOLECYSTECTOMY     • HAND SURGERY     • HERNIA REPAIR     • KIDNEY STONE SURGERY     • PACEMAKER IMPLANTATION N/A 2/26/2024    Procedure: Biv PPM Implant ([facility]), hold Eliquis 2 days, C&T Dr. Soto;  Surgeon: Gio Pappas MD;  Location:  [facility] INVASIVE LOCATION;  Service: Cardiovascular;  Laterality: N/A;     Family History   Problem Relation Age of Onset   • Stroke Mother    • Hypertension Mother    • Heart attack Mother    • Cancer Father         STOMACH   • Diabetes Father    • No Known Problems Sister      Social History     Socioeconomic History   • Marital status:    Tobacco Use   • Smoking status: Never     Passive exposure: Never   • Smokeless tobacco: Never   Vaping Use   • Vaping status: Never Used   Substance and Sexual Activity   • Alcohol use: No   • Drug use: No   • Sexual activity: Defer       Objective     Vital Signs:  /60 (BP Location: Right arm, Patient Position: Sitting, Cuff Size: Large Adult)   Pulse 81   Ht 182.9 cm (72\")   Wt 126 kg (278 lb)   SpO2 96%   BMI 37.70 kg/m²   Estimated body mass index is 37.7 kg/m² as calculated from the following:    Height " "as of this encounter: 182.9 cm (72\").    Weight as of this encounter: 126 kg (278 lb).         Physical Exam  Constitutional:       Appearance: Normal appearance. He is well-developed.   HENT:      Head: Normocephalic and atraumatic.   Eyes:      General: No scleral icterus.     Pupils: Pupils are equal, round, and reactive to light.   Cardiovascular:      Rate and Rhythm: Normal rate and regular rhythm.      Heart sounds: Normal heart sounds. No murmur heard.  Pulmonary:      Breath sounds: Normal breath sounds. No wheezing or rhonchi.   Musculoskeletal:      Right lower leg: No edema.      Left lower leg: No edema.   Skin:     Capillary Refill: Capillary refill takes less than 2 seconds.      Coloration: Skin is not cyanotic.      Nails: There is no clubbing.   Neurological:      Mental Status: He is alert and oriented to person, place, and time.      Motor: No weakness.      Gait: Gait normal.   Psychiatric:         Mood and Affect: Mood normal.         Behavior: Behavior is cooperative.         Thought Content: Thought content normal.         Cognition and Memory: Memory normal.                     Assessment and Plan     ASSESSMENTS AND ORDERS  Diagnoses and all orders for this visit:    1. Chronic systolic (congestive) heart failure (Primary)    2. Paroxysmal atrial fibrillation    3. Presence of biventricular cardiac pacemaker [Z95.0]    4. Essential hypertension    5. Functional diarrhea  -     Ambulatory Referral to Gastroenterology    6. Stomach pain  -     Ambulatory Referral to Gastroenterology             PLAN  -SOA likely conditioning/fluid, watch swelling and encourage to take lasix  -low BP seems to tolerate well.   -CRTP battery life looks good and good lead function.   - no recent a fib, continue eliquis  -last lipids were 7/22/24, will get more prior to next OV at PCP office  -due for colon, and having diarrhea and lower abd pain x sev weeks, will send to [facility] for colonoscopy and hold eliquis 2 " days prior.             Follow Up  Return in about 5 months (around 9/14/2025) for PM/ICD check.    DANIEL Soto MD  Cardiology and [facility]  04/14/2025     Please note that this explicitly excludes time spent on other separate billable services such as performing procedures or test interpretation, when applicable.    This note was created using dictation software which occasionally transcribes nonsensical phrases. Please contact the provider if any clarification is needed.

## 2025-04-15 ENCOUNTER — TELEPHONE (OUTPATIENT)
Dept: CARDIOLOGY | Facility: CLINIC | Age: 85
End: 2025-04-15
Payer: MEDICARE

## 2025-04-15 DIAGNOSIS — I48.0 PAROXYSMAL ATRIAL FIBRILLATION: ICD-10-CM

## 2025-04-15 RX ORDER — APIXABAN 5 MG/1
5 TABLET, FILM COATED ORAL EVERY 12 HOURS SCHEDULED
Qty: 180 TABLET | Refills: 3 | Status: SHIPPED | OUTPATIENT
Start: 2025-04-15

## 2025-04-15 NOTE — TELEPHONE ENCOUNTER
PT WIFE CALLED PT HAS RAN OUT OF ELIQUIS 5MG PT WIFE STATED THE OFFICE HAS BEEN GIVING THE PT SAMPLES AND HE NEEDS A PRESCRIPTION SENT TO [facility]. THANKS

## 2025-04-21 ENCOUNTER — TELEPHONE (OUTPATIENT)
Dept: CARDIOLOGY | Facility: CLINIC | Age: 85
End: 2025-04-21
Payer: MEDICARE

## 2025-04-21 NOTE — TELEPHONE ENCOUNTER
Pt seen in clinic on 4/14/25 by Dr. Soto.  Wife states he has been taking Carvedilol 6.25mg one in the AM and two in the PM.  Now new prescription states Carvedilol one pill twice a day.  She questions is this ok.  Follow up scheduled for 9/15/25.  Please advise.

## 2025-04-21 NOTE — TELEPHONE ENCOUNTER
PT WIFE BRUCE CALLED SHE PICKED UP CARVEDILOL FROM THE PHARMACY TODAY THE PT WAS TAKING IT ONCE IN THE MORNING AND TWICE AT NIGHT. HIS NEW PRESCRIPTION IS FOR TWICE A DAY. SHE WANTS TO KNOW IF THAT'S COREECT OR IF HE NEEDS A NEW SCRIPT FOR THE 3 TIMES A DAY. YOU CAN CONTACT HER @ [phone]. THANKS

## 2025-04-22 NOTE — TELEPHONE ENCOUNTER
I spoke to the pharmacist at [facility]. Script was filled on 2/24/25 for Carvedilol 6.25 mg bid by Dr. Andrew.

## 2025-05-13 ENCOUNTER — HOSPITAL ENCOUNTER (OUTPATIENT)
Dept: HOSPITAL 22 - LAB | Age: 85
Discharge: HOME | End: 2025-05-13
Payer: MEDICARE

## 2025-05-13 DIAGNOSIS — R14.0: ICD-10-CM

## 2025-05-13 DIAGNOSIS — D64.9: Primary | ICD-10-CM

## 2025-05-13 LAB
FERRITIN SERPL-MCNC: 36.3 NG/ML (ref 17.9–464)
IRON SERPL QL: 62 UG/DL (ref 49–181)
TOTAL IRON BINDING CAPACITY: 317 UG/DL (ref 261–462)

## 2025-05-13 PROCEDURE — 36415 COLL VENOUS BLD VENIPUNCTURE: CPT

## 2025-05-13 PROCEDURE — G0328 FECAL BLOOD SCRN IMMUNOASSAY: HCPCS

## 2025-05-13 PROCEDURE — 83550 IRON BINDING TEST: CPT

## 2025-05-13 PROCEDURE — 82272 OCCULT BLD FECES 1-3 TESTS: CPT

## 2025-05-13 PROCEDURE — 82656 EL-1 FECAL QUAL/SEMIQ: CPT

## 2025-05-13 PROCEDURE — 83540 ASSAY OF IRON: CPT

## 2025-05-13 PROCEDURE — 82728 ASSAY OF FERRITIN: CPT

## 2025-05-15 ENCOUNTER — TELEPHONE (OUTPATIENT)
Dept: CARDIOLOGY | Facility: CLINIC | Age: 85
End: 2025-05-15
Payer: MEDICARE

## 2025-05-15 NOTE — TELEPHONE ENCOUNTER
Fax request for cardiac clearance for colonoscopy scheduled on 6/4/25 with Dr Jacinto [facility].   P: [phone]  F: [phone]

## 2025-05-30 ENCOUNTER — TELEPHONE (OUTPATIENT)
Dept: CARDIOLOGY | Facility: CLINIC | Age: 85
End: 2025-05-30
Payer: MEDICARE

## 2025-05-30 DIAGNOSIS — I50.22 CHRONIC SYSTOLIC (CONGESTIVE) HEART FAILURE: ICD-10-CM

## 2025-05-30 NOTE — TELEPHONE ENCOUNTER
Caller: ANDREAS ROWLEY    Relationship: Emergency Contact    Best call back number: [phone]      What is the best time to reach you: ANY    Who are you requesting to speak with (clinical staff, provider,  specific staff member): CLINICAL    Do you know the name of the person who called: N/A    What was the call regarding: PATIENTS WIFE IS CHECKING ON STATUS OF CARDIAC CLEARANCE THAT CAME FROM DR. SIMMS OFFICE. FOR PATIENT TO HAVE COLONOSCOPY ON 6-4-25 IN [facility].    Is it okay if the provider responds through MyChart: CALL

## 2025-05-30 NOTE — TELEPHONE ENCOUNTER
Caller: ANDREAS ROWLEY    Relationship: Emergency Contact    Best call back number: [phone]    Requested Prescriptions:   Requested Prescriptions     Pending Prescriptions Disp Refills    furosemide (LASIX) 40 MG tablet       Sig: Take 1 tablet by mouth Daily As Needed (swelling).    potassium chloride (Klor-Con M20) 20 MEQ CR tablet 60 tablet 3     Sig: Take 1 tablet by mouth 2 (Two) Times a Day.        Pharmacy where request should be sent: [id number] [place] [address] [facility] - [phone] [facility] [phone]      Last office visit with prescribing clinician: 4/14/2025   Last telemedicine visit with prescribing clinician: Visit date not found   Next office visit with prescribing clinician: 9/15/2025     Additional details provided by patient:     Does the patient have less than a 3 day supply:  [x] Yes  [] No    Would you like a call back once the refill request has been completed: [] Yes [] No    If the office needs to give you a call back, can they leave a voicemail: [] Yes [] No    Quique Alcantara Rep   05/30/25 11:08 EDT

## 2025-06-02 ENCOUNTER — TELEPHONE (OUTPATIENT)
Dept: CARDIOLOGY | Facility: CLINIC | Age: 85
End: 2025-06-02
Payer: MEDICARE

## 2025-06-02 RX ORDER — POTASSIUM CHLORIDE 1500 MG/1
20 TABLET, EXTENDED RELEASE ORAL 2 TIMES DAILY
Qty: 60 TABLET | Refills: 3 | Status: SHIPPED | OUTPATIENT
Start: 2025-06-02

## 2025-06-02 RX ORDER — FUROSEMIDE 40 MG/1
40 TABLET ORAL DAILY PRN
Qty: 30 TABLET | Refills: 1
Start: 2025-06-02

## 2025-06-02 NOTE — TELEPHONE ENCOUNTER
----- Message from Mckenna Zuniga sent at 6/2/2025  9:07 AM EDT -----  Regarding: labs  Patient needed Lasix and potassium refill. I send in but he did not have any recent labs. Can we check with the patient and see if done? IF NOT, let me know and  I will order his labs for him to do: CMP and lipids for our office. Might want to see if Dr. Carlos wants to do his annual labs and get them drawn at Dr. Carlos office to prevent to Lab draws.

## 2025-06-04 ENCOUNTER — HOSPITAL ENCOUNTER (OUTPATIENT)
Age: 85
Discharge: HOME | End: 2025-06-04
Payer: MEDICARE

## 2025-06-04 VITALS — BODY MASS INDEX: 42 KG/M2

## 2025-06-04 VITALS
OXYGEN SATURATION: 94 % | DIASTOLIC BLOOD PRESSURE: 74 MMHG | SYSTOLIC BLOOD PRESSURE: 120 MMHG | TEMPERATURE: 97.5 F | HEART RATE: 76 BPM | RESPIRATION RATE: 18 BRPM

## 2025-06-04 VITALS
OXYGEN SATURATION: 95 % | SYSTOLIC BLOOD PRESSURE: 119 MMHG | RESPIRATION RATE: 18 BRPM | HEART RATE: 78 BPM | TEMPERATURE: 98.42 F | DIASTOLIC BLOOD PRESSURE: 70 MMHG

## 2025-06-04 VITALS
HEART RATE: 73 BPM | DIASTOLIC BLOOD PRESSURE: 73 MMHG | RESPIRATION RATE: 18 BRPM | TEMPERATURE: 97.5 F | SYSTOLIC BLOOD PRESSURE: 124 MMHG | OXYGEN SATURATION: 94 %

## 2025-06-04 VITALS
SYSTOLIC BLOOD PRESSURE: 122 MMHG | HEART RATE: 61 BPM | TEMPERATURE: 96.98 F | OXYGEN SATURATION: 98 % | DIASTOLIC BLOOD PRESSURE: 70 MMHG | RESPIRATION RATE: 18 BRPM

## 2025-06-04 VITALS
RESPIRATION RATE: 18 BRPM | HEART RATE: 72 BPM | OXYGEN SATURATION: 98 % | DIASTOLIC BLOOD PRESSURE: 69 MMHG | SYSTOLIC BLOOD PRESSURE: 121 MMHG | TEMPERATURE: 97.5 F

## 2025-06-04 DIAGNOSIS — Z79.899: ICD-10-CM

## 2025-06-04 DIAGNOSIS — N50.811: ICD-10-CM

## 2025-06-04 DIAGNOSIS — Z80.0: ICD-10-CM

## 2025-06-04 DIAGNOSIS — K64.1: ICD-10-CM

## 2025-06-04 DIAGNOSIS — R10.32: ICD-10-CM

## 2025-06-04 DIAGNOSIS — K63.5: Primary | ICD-10-CM

## 2025-06-04 DIAGNOSIS — R19.5: ICD-10-CM

## 2025-06-04 DIAGNOSIS — K40.90: ICD-10-CM

## 2025-06-04 DIAGNOSIS — D64.9: ICD-10-CM

## 2025-06-04 DIAGNOSIS — R14.0: ICD-10-CM

## 2025-06-04 DIAGNOSIS — K57.30: ICD-10-CM

## 2025-06-04 DIAGNOSIS — N40.2: ICD-10-CM

## 2025-06-04 DIAGNOSIS — Z79.84: ICD-10-CM

## 2025-06-04 LAB
GLUCOSE BLDC GLUCOMTR-MCNC: 134 MG/DL (ref 70–110)
PROSTATE SPECIFIC AG, DIAGNOST: 1.56 NG/ML (ref 0–4)

## 2025-06-04 PROCEDURE — 45385 COLONOSCOPY W/LESION REMOVAL: CPT

## 2025-06-04 PROCEDURE — 88305 TISSUE EXAM BY PATHOLOGIST: CPT

## 2025-06-04 PROCEDURE — 0DJD8ZZ INSPECTION OF LOWER INTESTINAL TRACT, VIA NATURAL OR ARTIFICIAL OPENING ENDOSCOPIC: ICD-10-PCS | Performed by: INTERNAL MEDICINE

## 2025-06-04 PROCEDURE — 36415 COLL VENOUS BLD VENIPUNCTURE: CPT

## 2025-06-04 PROCEDURE — 84153 ASSAY OF PSA TOTAL: CPT

## 2025-06-04 PROCEDURE — 82962 GLUCOSE BLOOD TEST: CPT

## 2025-06-05 RX ORDER — SACUBITRIL AND VALSARTAN 24; 26 MG/1; MG/1
1 TABLET, FILM COATED ORAL 2 TIMES DAILY
Qty: 60 TABLET | Refills: 3 | Status: SHIPPED | OUTPATIENT
Start: 2025-06-05

## 2025-06-06 ENCOUNTER — TELEPHONE (OUTPATIENT)
Dept: CARDIOLOGY | Facility: CLINIC | Age: 85
End: 2025-06-06
Payer: MEDICARE

## 2025-06-06 NOTE — TELEPHONE ENCOUNTER
PT CAME INTO OFFICE 6/5 REQUESTING  HIS RECORDS FROM [facility] DR. SIMMS. I FAXED THE REQUEST THEY WILL SEND HIS RECORDS AFTER HIS PATHOLOGY RESULTS COME BACK. I WILL CHECK BACK WITH THE OFFICE NEXT WEEK IF NOT RECEIVED BY END OF WEEK.

## 2025-06-12 RX ORDER — ATORVASTATIN CALCIUM 40 MG/1
40 TABLET, FILM COATED ORAL DAILY
Qty: 90 TABLET | Refills: 1 | Status: SHIPPED | OUTPATIENT
Start: 2025-06-12

## 2025-06-13 ENCOUNTER — TELEPHONE (OUTPATIENT)
Dept: CARDIOLOGY | Facility: CLINIC | Age: 85
End: 2025-06-13
Payer: MEDICARE

## 2025-06-13 NOTE — TELEPHONE ENCOUNTER
REQUEST FOR CARDIAC CLEARANCE    Caller name: KATIE FROM DR. ALVAREZ'S OFFICE     Phone Number: [phone]     Surgeon's name: DR. ALVAREZ    Type of planned surgery: RIGHT INGUINAL HERNIA REPAIR    Date of planned surgery: NOT SCHEDULED    Type of anesthesia: UNKNOWN    Have you been experiencing chest pain or shortness of breath? UNKNOWN    Is your doctor requesting for you to stop any of your medications prior to your surgery? BLOOD THINNERS    Where should we fax the clearance to? [phone]

## 2025-06-13 NOTE — TELEPHONE ENCOUNTER
..Any new symptoms since last OV such as chest pain SOA? NO  Any worsening of edema or worsening palpitations? NO  Any major medical issues since last OV we need to know? NO  Is the patient on Eliquis, xarelto, pradaxa? ELIQUIS  Is the patient on aspirin? NO  Is the patient on brilinta (ticagrelor), plavix (clopidogrel), prasugrel (effient)? NO  Is the patient on medications like mounjaro or ozempic? NO  Last EKG? 02/19/2025  Last stress test? 06/27/2023  Last echo? 12/17/2024  Last heart cath or CCTA? 08/15/23  Last OV? 04/14/2025  LAST PM CHECK: 4/14/25

## 2025-06-13 NOTE — PROGRESS NOTES
..    [facility] [place]  [address]  [facility] [address]  Phone: [phone]  Fax: [phone]    Cardiac Risk Assessment Review    Date: 25    Patient Name: Gio Gutiérrez  Patient :   1940      ..Any new symptoms since last OV such as chest pain SOA? NO  Any worsening of edema or worsening palpitations? NO  Any major medical issues since last OV we need to know? NO  Is the patient on Eliquis, xarelto, pradaxa? ELIQUIS  Is the patient on aspirin? NO  Is the patient on brilinta (ticagrelor), plavix (clopidogrel), prasugrel (effient)? NO  Is the patient on medications like mounjaro or ozempic? NO  Last EKG? 2025  Last stress test? 2023  Last echo? 2024  Last heart cath or CCTA? 08/15/25  Last OV? 2025  LAST PM CHECK: 25                REQUEST FOR CARDIAC CLEARANCE     Caller name: KATIE FROM DR. ALVAREZ'S OFFICE      Phone Number: [phone]      Surgeon's name: DR. ALVAREZ     Type of planned surgery: RIGHT INGUINAL HERNIA REPAIR     Date of planned surgery: NOT SCHEDULED            Based on the above test results and/or clinical evaluation, it is my recommendation:    [x]  Patient has an acceptable cardiac risk for the procedure as planned. Limitations of risk assessments have been reviewed with the patient.    [x] Patient can NOT stop Eliquis 2 days prior to the procedure.    [x] The patient has obstructive sleep apnea and/or central sleep apnea, and appropriate anesthesia precautions should be taken.     [x] Beta blockers should not be held in the perioperative period.      If you have any questions, please call our office at [phone].    Thank you,    NAV Hernandez   [facility]                         NAV Hernandez

## 2025-06-16 ENCOUNTER — TELEPHONE (OUTPATIENT)
Dept: CARDIOLOGY | Facility: CLINIC | Age: 85
End: 2025-06-16
Payer: MEDICARE

## 2025-06-16 NOTE — TELEPHONE ENCOUNTER
"[facility] for Dr. Arriola's office called about the preop clearance that was faxed. She is questioning the statement that is marked that \"Patient may NOT stop Eliquis for 2 days\". Also the date of the heart cath was 8/15/2023 not 8/15/25. Please clarify Eliquis.     Fax to [facility] @ [phone].  "

## 2025-06-17 ENCOUNTER — TELEPHONE (OUTPATIENT)
Dept: CARDIOLOGY | Facility: CLINIC | Age: 85
End: 2025-06-17
Payer: MEDICARE

## 2025-06-17 NOTE — TELEPHONE ENCOUNTER
Caller: NATALIIA POMPA    Relationship: Other    Best call back number: [phone]    What is the best time to reach you: 8 and 4:30    Who are you requesting to speak with (clinical staff, provider,  specific staff member): CLINICAL       What was the call regarding: THEY NEED CLEARANCE FAXED OVER PLEASE FAX -[phone] PLEASE FAX OVER ELIQUIS ORDER

## 2025-07-09 LAB
MDC_IDC_MSMT_BATTERY_REMAINING_LONGEVITY: 88 MO
MDC_IDC_MSMT_BATTERY_REMAINING_PERCENTAGE: 88 %
MDC_IDC_MSMT_BATTERY_RRT_TRIGGER: 2.62
MDC_IDC_MSMT_BATTERY_STATUS: NORMAL
MDC_IDC_MSMT_BATTERY_VOLTAGE: 2.99
MDC_IDC_MSMT_LEADCHNL_LV_DTM: NORMAL
MDC_IDC_MSMT_LEADCHNL_LV_IMPEDANCE_VALUE: 940
MDC_IDC_MSMT_LEADCHNL_LV_PACING_THRESHOLD_AMPLITUDE: 0.75
MDC_IDC_MSMT_LEADCHNL_LV_PACING_THRESHOLD_POLARITY: NORMAL
MDC_IDC_MSMT_LEADCHNL_LV_PACING_THRESHOLD_PULSEWIDTH: 0.5
MDC_IDC_MSMT_LEADCHNL_RA_DTM: NORMAL
MDC_IDC_MSMT_LEADCHNL_RA_IMPEDANCE_VALUE: 430
MDC_IDC_MSMT_LEADCHNL_RA_PACING_THRESHOLD_AMPLITUDE: 1
MDC_IDC_MSMT_LEADCHNL_RA_PACING_THRESHOLD_POLARITY: NORMAL
MDC_IDC_MSMT_LEADCHNL_RA_PACING_THRESHOLD_PULSEWIDTH: 0.4
MDC_IDC_MSMT_LEADCHNL_RA_SENSING_INTR_AMPL: 2.4
MDC_IDC_MSMT_LEADCHNL_RV_DTM: NORMAL
MDC_IDC_MSMT_LEADCHNL_RV_IMPEDANCE_VALUE: 430
MDC_IDC_MSMT_LEADCHNL_RV_PACING_THRESHOLD_AMPLITUDE: 0.88
MDC_IDC_MSMT_LEADCHNL_RV_PACING_THRESHOLD_POLARITY: NORMAL
MDC_IDC_MSMT_LEADCHNL_RV_PACING_THRESHOLD_PULSEWIDTH: 0.5
MDC_IDC_MSMT_LEADCHNL_RV_SENSING_INTR_AMPL: 2.2
MDC_IDC_PG_IMPLANT_DTM: NORMAL
MDC_IDC_PG_MFG: NORMAL
MDC_IDC_PG_MODEL: NORMAL
MDC_IDC_PG_SERIAL: NORMAL
MDC_IDC_PG_TYPE: NORMAL
MDC_IDC_SESS_DTM: NORMAL
MDC_IDC_SESS_TYPE: NORMAL
MDC_IDC_SET_BRADY_AT_MODE_SWITCH_RATE: 160
MDC_IDC_SET_BRADY_LOWRATE: 60
MDC_IDC_SET_BRADY_MAX_SENSOR_RATE: 105
MDC_IDC_SET_BRADY_MAX_TRACKING_RATE: 125
MDC_IDC_SET_BRADY_MODE: NORMAL
MDC_IDC_SET_BRADY_PAV_DELAY: 350
MDC_IDC_SET_BRADY_SAV_DELAY: 325
MDC_IDC_SET_CRT_LVRV_DELAY: 0
MDC_IDC_SET_CRT_PACED_CHAMBERS: NORMAL
MDC_IDC_SET_LEADCHNL_LV_PACING_AMPLITUDE: 2
MDC_IDC_SET_LEADCHNL_LV_PACING_POLARITY: NORMAL
MDC_IDC_SET_LEADCHNL_LV_PACING_PULSEWIDTH: 0.5
MDC_IDC_SET_LEADCHNL_RA_PACING_AMPLITUDE: 2
MDC_IDC_SET_LEADCHNL_RA_PACING_POLARITY: NORMAL
MDC_IDC_SET_LEADCHNL_RA_PACING_PULSEWIDTH: 0.4
MDC_IDC_SET_LEADCHNL_RA_SENSING_POLARITY: NORMAL
MDC_IDC_SET_LEADCHNL_RA_SENSING_SENSITIVITY: 0.5
MDC_IDC_SET_LEADCHNL_RV_PACING_AMPLITUDE: 2
MDC_IDC_SET_LEADCHNL_RV_PACING_POLARITY: NORMAL
MDC_IDC_SET_LEADCHNL_RV_PACING_PULSEWIDTH: 0.5
MDC_IDC_SET_LEADCHNL_RV_SENSING_POLARITY: NORMAL
MDC_IDC_SET_LEADCHNL_RV_SENSING_SENSITIVITY: 0.5
MDC_IDC_STAT_AT_BURDEN_PERCENT: 1
MDC_IDC_STAT_BRADY_RA_PERCENT_PACED: 7.1
MDC_IDC_STAT_BRADY_RV_PERCENT_PACED: 92.9
MDC_IDC_STAT_CRT_PERCENT_PACED: 93

## 2025-07-24 DIAGNOSIS — I50.22 CHRONIC SYSTOLIC (CONGESTIVE) HEART FAILURE: ICD-10-CM

## 2025-07-24 RX ORDER — FUROSEMIDE 40 MG/1
40 TABLET ORAL 2 TIMES DAILY
Qty: 30 TABLET | Refills: 1 | Status: SHIPPED | OUTPATIENT
Start: 2025-07-24

## 2025-07-24 NOTE — TELEPHONE ENCOUNTER
Spoke to wife she states patient takes Lasix 40mg 1 tablet in am and 1 tablet in evening and is needing a refill. Last filled was just 1 40mg tablet once a day as needed for swelling. Please advise.

## 2025-07-25 ENCOUNTER — HOSPITAL ENCOUNTER (EMERGENCY)
Age: 85
Discharge: HOME | End: 2025-07-25
Payer: MEDICARE

## 2025-07-25 VITALS
OXYGEN SATURATION: 98 % | TEMPERATURE: 97.88 F | SYSTOLIC BLOOD PRESSURE: 126 MMHG | HEART RATE: 68 BPM | DIASTOLIC BLOOD PRESSURE: 72 MMHG | RESPIRATION RATE: 16 BRPM

## 2025-07-25 VITALS
SYSTOLIC BLOOD PRESSURE: 126 MMHG | HEART RATE: 68 BPM | DIASTOLIC BLOOD PRESSURE: 72 MMHG | TEMPERATURE: 97.88 F | OXYGEN SATURATION: 98 % | RESPIRATION RATE: 17 BRPM

## 2025-07-25 VITALS — BODY MASS INDEX: 38 KG/M2

## 2025-07-25 DIAGNOSIS — L76.22: Primary | ICD-10-CM

## 2025-07-25 DIAGNOSIS — Z87.19: ICD-10-CM

## 2025-07-25 PROCEDURE — 99282 EMERGENCY DEPT VISIT SF MDM: CPT

## 2025-08-11 ENCOUNTER — TELEPHONE (OUTPATIENT)
Dept: CARDIOLOGY | Facility: CLINIC | Age: 85
End: 2025-08-11
Payer: MEDICARE

## 2025-08-11 ENCOUNTER — CLINICAL SUPPORT (OUTPATIENT)
Dept: CARDIOLOGY | Facility: CLINIC | Age: 85
End: 2025-08-11
Payer: MEDICARE

## 2025-08-11 VITALS
DIASTOLIC BLOOD PRESSURE: 60 MMHG | HEART RATE: 72 BPM | OXYGEN SATURATION: 98 % | HEIGHT: 72 IN | BODY MASS INDEX: 38.39 KG/M2 | WEIGHT: 283.4 LBS | SYSTOLIC BLOOD PRESSURE: 100 MMHG | RESPIRATION RATE: 18 BRPM

## 2025-08-14 ENCOUNTER — RESULTS FOLLOW-UP (OUTPATIENT)
Dept: CARDIOLOGY | Facility: CLINIC | Age: 85
End: 2025-08-14
Payer: MEDICARE

## 2025-08-14 ENCOUNTER — OFFICE VISIT (OUTPATIENT)
Dept: CARDIOLOGY | Facility: CLINIC | Age: 85
End: 2025-08-14
Payer: MEDICARE

## 2025-08-14 VITALS
DIASTOLIC BLOOD PRESSURE: 62 MMHG | BODY MASS INDEX: 38.06 KG/M2 | OXYGEN SATURATION: 95 % | HEART RATE: 78 BPM | WEIGHT: 281 LBS | SYSTOLIC BLOOD PRESSURE: 100 MMHG | HEIGHT: 72 IN

## 2025-08-14 DIAGNOSIS — I50.22 CHRONIC SYSTOLIC (CONGESTIVE) HEART FAILURE: Primary | ICD-10-CM

## 2025-08-14 DIAGNOSIS — R06.02 SOB (SHORTNESS OF BREATH): ICD-10-CM

## 2025-08-14 DIAGNOSIS — I50.22 CHRONIC SYSTOLIC (CONGESTIVE) HEART FAILURE: ICD-10-CM

## 2025-08-14 PROCEDURE — 99214 OFFICE O/P EST MOD 30 MIN: CPT | Performed by: NURSE PRACTITIONER

## 2025-08-14 PROCEDURE — 93000 ELECTROCARDIOGRAM COMPLETE: CPT | Performed by: NURSE PRACTITIONER

## 2025-08-14 RX ORDER — PANCRELIPASE 36000; 180000; 114000 [USP'U]/1; [USP'U]/1; [USP'U]/1
CAPSULE, DELAYED RELEASE PELLETS ORAL
COMMUNITY
Start: 2025-07-24

## 2025-08-14 RX ORDER — FUROSEMIDE 40 MG/1
40 TABLET ORAL 2 TIMES DAILY
Qty: 60 TABLET | Refills: 1 | Status: SHIPPED | OUTPATIENT
Start: 2025-08-14

## 2025-08-15 DIAGNOSIS — I50.22 CHRONIC SYSTOLIC (CONGESTIVE) HEART FAILURE: ICD-10-CM

## 2025-08-15 DIAGNOSIS — R06.02 SOB (SHORTNESS OF BREATH): ICD-10-CM

## 2025-08-20 DIAGNOSIS — I50.22 CHRONIC SYSTOLIC (CONGESTIVE) HEART FAILURE: ICD-10-CM

## 2025-08-20 RX ORDER — FUROSEMIDE 40 MG/1
40 TABLET ORAL 2 TIMES DAILY
Qty: 60 TABLET | Refills: 1 | Status: SHIPPED | OUTPATIENT
Start: 2025-08-20

## 2025-08-20 RX ORDER — POTASSIUM CHLORIDE 1500 MG/1
20 TABLET, EXTENDED RELEASE ORAL 2 TIMES DAILY
Qty: 60 TABLET | Refills: 3 | Status: SHIPPED | OUTPATIENT
Start: 2025-08-20

## (undated) DEVICE — SLITTER CATH GUIDE ATTAIN ADJ

## (undated) DEVICE — MODEL AT P65, P/N 701554-001KIT CONTENTS: HAND CONTROLLER, 3-WAY HIGH-PRESSURE STOPCOCK WITH ROTATING END AND PREMIUM HIGH-PRESSURE TUBING: Brand: ANGIOTOUCH® KIT

## (undated) DEVICE — NDL ANGIOGR ADV THN SMOTH SGLWALL 21G 1.5

## (undated) DEVICE — ADULT, W/LG. BACK PAD, RADIOTRANSPARENT ELEMENT AND LEAD WIRE: Brand: DEFIBRILLATION ELECTRODES

## (undated) DEVICE — GW PERIPH GUIDERIGHT STD/EXCHNG/J/TIP SS 0.035IN 5X260CM

## (undated) DEVICE — CATH DIAG EXPO .056 FL3.5 6F 100CM

## (undated) DEVICE — TBG SXN CONN/F UNIV 1/4IN 10FT LF STRL

## (undated) DEVICE — RESTRNT LIMB DBL STRAP BUCKL W/TIE 13.5X3X52IN

## (undated) DEVICE — MODEL BT2000 P/N 700287-012KIT CONTENTS: MANIFOLD WITH SALINE AND CONTRAST PORTS, SALINE TUBING WITH SPIKE AND HAND SYRINGE, TRANSDUCER: Brand: BT2000 AUTOMATED MANIFOLD KIT

## (undated) DEVICE — CATH DIAG EXPO M/ PK 6FR FL4/FR4 PIG 3PK

## (undated) DEVICE — DECANT BG O JET

## (undated) DEVICE — CATH GUIDE ATTAIN COMMND SURVLV EH9F50CM

## (undated) DEVICE — CLN CAUTRY TP POLISHER 5X5CM

## (undated) DEVICE — ELECTRD DEFIB ZOLL/CONN RADIOPRNT LG/BKPAD A/

## (undated) DEVICE — SET PRIMARY GRVTY 10DP MALE LL 104IN

## (undated) DEVICE — DEV COMPR RADL PRELUDESYNCEZ 30ML 32CM

## (undated) DEVICE — INTRO TEAR AWAY/LVD W/SD PRT 6F 13CM

## (undated) DEVICE — ST INF PRI SMRTSTE 20DRP 2VLV 24ML 117

## (undated) DEVICE — DRSNG SURESITE123 4X4.8IN

## (undated) DEVICE — DRSNG SURG AQUACEL AG/ADVNTGE 9X15CM 3.5X6IN

## (undated) DEVICE — ST EXT IV SMRTSTE 2VLV FIX M LL 6ML 41

## (undated) DEVICE — PK EP 10

## (undated) DEVICE — TBG PENCL TELESCP MEGADYNE SMOKE EVAC 10FT

## (undated) DEVICE — ELECTRD RETRN/GRND MEGADYNE SGL/PLT W/CORD 9FT DISP

## (undated) DEVICE — PK CATH CARD 10

## (undated) DEVICE — TRAP FLD MINIVAC MEGADYNE 100ML

## (undated) DEVICE — INTRO TEAR AWAY/LVD W/SD PRT 9F 13CM

## (undated) DEVICE — SOL NACL 0.9PCT 1000ML

## (undated) DEVICE — GLIDESHEATH BASIC HYDROPHILIC COATED INTRODUCER SHEATH: Brand: GLIDESHEATH

## (undated) DEVICE — TP SXN YANKR WO/ VNT CLR LF

## (undated) DEVICE — CATH DIAG EXPO .056 AL2 6F 100CM

## (undated) DEVICE — GW INQWIRE FC PTFE STD J/1.5 .035 260

## (undated) DEVICE — Device

## (undated) DEVICE — IRRIGATOR BULB ASEPTO 60CC STRL

## (undated) DEVICE — CANN NASL CO2 DIVIDED A/